# Patient Record
Sex: FEMALE | Race: WHITE | NOT HISPANIC OR LATINO
[De-identification: names, ages, dates, MRNs, and addresses within clinical notes are randomized per-mention and may not be internally consistent; named-entity substitution may affect disease eponyms.]

---

## 2017-02-26 ENCOUNTER — TRANSCRIPTION ENCOUNTER (OUTPATIENT)
Age: 57
End: 2017-02-26

## 2017-10-16 ENCOUNTER — APPOINTMENT (OUTPATIENT)
Dept: MAMMOGRAPHY | Facility: HOSPITAL | Age: 57
End: 2017-10-16
Payer: COMMERCIAL

## 2017-10-16 ENCOUNTER — OUTPATIENT (OUTPATIENT)
Dept: OUTPATIENT SERVICES | Facility: HOSPITAL | Age: 57
LOS: 1 days | End: 2017-10-16
Payer: COMMERCIAL

## 2017-10-16 PROCEDURE — G0202: CPT | Mod: 26

## 2017-10-16 PROCEDURE — 77067 SCR MAMMO BI INCL CAD: CPT

## 2017-10-16 PROCEDURE — 77063 BREAST TOMOSYNTHESIS BI: CPT | Mod: 26

## 2017-10-16 PROCEDURE — 77063 BREAST TOMOSYNTHESIS BI: CPT

## 2018-02-15 ENCOUNTER — TRANSCRIPTION ENCOUNTER (OUTPATIENT)
Age: 58
End: 2018-02-15

## 2018-10-15 ENCOUNTER — TRANSCRIPTION ENCOUNTER (OUTPATIENT)
Age: 58
End: 2018-10-15

## 2018-11-12 ENCOUNTER — OUTPATIENT (OUTPATIENT)
Dept: OUTPATIENT SERVICES | Facility: HOSPITAL | Age: 58
LOS: 1 days | End: 2018-11-12
Payer: COMMERCIAL

## 2018-11-12 ENCOUNTER — APPOINTMENT (OUTPATIENT)
Dept: MAMMOGRAPHY | Facility: HOSPITAL | Age: 58
End: 2018-11-12
Payer: COMMERCIAL

## 2018-11-12 DIAGNOSIS — Z00.8 ENCOUNTER FOR OTHER GENERAL EXAMINATION: ICD-10-CM

## 2018-11-12 PROCEDURE — 77067 SCR MAMMO BI INCL CAD: CPT

## 2018-11-12 PROCEDURE — 77063 BREAST TOMOSYNTHESIS BI: CPT

## 2018-11-12 PROCEDURE — 77063 BREAST TOMOSYNTHESIS BI: CPT | Mod: 26

## 2018-11-12 PROCEDURE — 77067 SCR MAMMO BI INCL CAD: CPT | Mod: 26

## 2019-09-20 ENCOUNTER — TRANSCRIPTION ENCOUNTER (OUTPATIENT)
Age: 59
End: 2019-09-20

## 2019-12-09 ENCOUNTER — APPOINTMENT (OUTPATIENT)
Dept: MAMMOGRAPHY | Facility: HOSPITAL | Age: 59
End: 2019-12-09
Payer: COMMERCIAL

## 2019-12-09 ENCOUNTER — OUTPATIENT (OUTPATIENT)
Dept: OUTPATIENT SERVICES | Facility: HOSPITAL | Age: 59
LOS: 1 days | End: 2019-12-09
Payer: COMMERCIAL

## 2019-12-09 DIAGNOSIS — Z00.8 ENCOUNTER FOR OTHER GENERAL EXAMINATION: ICD-10-CM

## 2019-12-09 PROCEDURE — 77063 BREAST TOMOSYNTHESIS BI: CPT

## 2019-12-09 PROCEDURE — 77063 BREAST TOMOSYNTHESIS BI: CPT | Mod: 26

## 2019-12-09 PROCEDURE — 77067 SCR MAMMO BI INCL CAD: CPT

## 2019-12-09 PROCEDURE — 77067 SCR MAMMO BI INCL CAD: CPT | Mod: 26

## 2019-12-18 ENCOUNTER — TRANSCRIPTION ENCOUNTER (OUTPATIENT)
Age: 59
End: 2019-12-18

## 2020-03-26 ENCOUNTER — TRANSCRIPTION ENCOUNTER (OUTPATIENT)
Age: 60
End: 2020-03-26

## 2020-04-02 ENCOUNTER — APPOINTMENT (OUTPATIENT)
Dept: ORTHOPEDIC SURGERY | Facility: CLINIC | Age: 60
End: 2020-04-02
Payer: COMMERCIAL

## 2020-04-02 ENCOUNTER — APPOINTMENT (OUTPATIENT)
Dept: MRI IMAGING | Facility: HOSPITAL | Age: 60
End: 2020-04-02

## 2020-04-02 ENCOUNTER — OUTPATIENT (OUTPATIENT)
Dept: OUTPATIENT SERVICES | Facility: HOSPITAL | Age: 60
LOS: 1 days | End: 2020-04-02
Payer: COMMERCIAL

## 2020-04-02 VITALS
TEMPERATURE: 98.7 F | HEIGHT: 62.5 IN | BODY MASS INDEX: 32.65 KG/M2 | HEART RATE: 95 BPM | WEIGHT: 182 LBS | SYSTOLIC BLOOD PRESSURE: 160 MMHG | DIASTOLIC BLOOD PRESSURE: 90 MMHG

## 2020-04-02 DIAGNOSIS — Z00.8 ENCOUNTER FOR OTHER GENERAL EXAMINATION: ICD-10-CM

## 2020-04-02 PROCEDURE — 72148 MRI LUMBAR SPINE W/O DYE: CPT

## 2020-04-02 PROCEDURE — 72110 X-RAY EXAM L-2 SPINE 4/>VWS: CPT

## 2020-04-02 PROCEDURE — 99203 OFFICE O/P NEW LOW 30 MIN: CPT

## 2020-04-02 PROCEDURE — 72170 X-RAY EXAM OF PELVIS: CPT | Mod: 59

## 2020-04-02 PROCEDURE — 72148 MRI LUMBAR SPINE W/O DYE: CPT | Mod: 26

## 2020-04-03 ENCOUNTER — TRANSCRIPTION ENCOUNTER (OUTPATIENT)
Age: 60
End: 2020-04-03

## 2020-04-16 ENCOUNTER — APPOINTMENT (OUTPATIENT)
Dept: ORTHOPEDIC SURGERY | Facility: CLINIC | Age: 60
End: 2020-04-16
Payer: COMMERCIAL

## 2020-04-16 VITALS — BODY MASS INDEX: 32.65 KG/M2 | WEIGHT: 182 LBS | HEIGHT: 62.5 IN | TEMPERATURE: 98.1 F

## 2020-04-16 DIAGNOSIS — M54.16 RADICULOPATHY, LUMBAR REGION: ICD-10-CM

## 2020-04-16 DIAGNOSIS — M51.26 OTHER INTERVERTEBRAL DISC DISPLACEMENT, LUMBAR REGION: ICD-10-CM

## 2020-04-16 PROCEDURE — 99214 OFFICE O/P EST MOD 30 MIN: CPT

## 2020-04-25 ENCOUNTER — MESSAGE (OUTPATIENT)
Age: 60
End: 2020-04-25

## 2020-05-01 ENCOUNTER — APPOINTMENT (OUTPATIENT)
Dept: DISASTER EMERGENCY | Facility: CLINIC | Age: 60
End: 2020-05-01

## 2020-05-02 LAB
SARS-COV-2 IGG SERPL IA-ACNC: 0 INDEX
SARS-COV-2 IGG SERPL QL IA: NEGATIVE

## 2021-05-01 ENCOUNTER — TRANSCRIPTION ENCOUNTER (OUTPATIENT)
Age: 61
End: 2021-05-01

## 2021-09-19 ENCOUNTER — APPOINTMENT (OUTPATIENT)
Dept: MRI IMAGING | Facility: IMAGING CENTER | Age: 61
End: 2021-09-19
Payer: COMMERCIAL

## 2021-09-19 ENCOUNTER — OUTPATIENT (OUTPATIENT)
Dept: OUTPATIENT SERVICES | Facility: HOSPITAL | Age: 61
LOS: 1 days | End: 2021-09-19
Payer: COMMERCIAL

## 2021-09-19 ENCOUNTER — RESULT REVIEW (OUTPATIENT)
Age: 61
End: 2021-09-19

## 2021-09-19 DIAGNOSIS — Z00.8 ENCOUNTER FOR OTHER GENERAL EXAMINATION: ICD-10-CM

## 2021-09-19 PROCEDURE — 73718 MRI LOWER EXTREMITY W/O DYE: CPT | Mod: 26,RT

## 2021-09-19 PROCEDURE — 73718 MRI LOWER EXTREMITY W/O DYE: CPT

## 2022-01-31 ENCOUNTER — NON-APPOINTMENT (OUTPATIENT)
Age: 62
End: 2022-01-31

## 2022-01-31 ENCOUNTER — APPOINTMENT (OUTPATIENT)
Dept: INTERNAL MEDICINE | Facility: CLINIC | Age: 62
End: 2022-01-31
Payer: COMMERCIAL

## 2022-01-31 ENCOUNTER — EMERGENCY (EMERGENCY)
Facility: HOSPITAL | Age: 62
LOS: 1 days | Discharge: HOME | End: 2022-01-31
Attending: EMERGENCY MEDICINE
Payer: COMMERCIAL

## 2022-01-31 VITALS
TEMPERATURE: 97.6 F | SYSTOLIC BLOOD PRESSURE: 186 MMHG | WEIGHT: 194 LBS | HEIGHT: 63 IN | DIASTOLIC BLOOD PRESSURE: 106 MMHG | BODY MASS INDEX: 34.38 KG/M2 | OXYGEN SATURATION: 96 % | HEART RATE: 92 BPM

## 2022-01-31 VITALS
DIASTOLIC BLOOD PRESSURE: 120 MMHG | WEIGHT: 190.04 LBS | TEMPERATURE: 99 F | HEART RATE: 100 BPM | OXYGEN SATURATION: 97 % | HEIGHT: 62 IN | RESPIRATION RATE: 16 BRPM | SYSTOLIC BLOOD PRESSURE: 229 MMHG

## 2022-01-31 VITALS
TEMPERATURE: 98 F | HEART RATE: 84 BPM | SYSTOLIC BLOOD PRESSURE: 173 MMHG | DIASTOLIC BLOOD PRESSURE: 115 MMHG | RESPIRATION RATE: 17 BRPM | OXYGEN SATURATION: 95 %

## 2022-01-31 DIAGNOSIS — R94.31 ABNORMAL ELECTROCARDIOGRAM [ECG] [EKG]: ICD-10-CM

## 2022-01-31 LAB
ALBUMIN SERPL ELPH-MCNC: 4.6 G/DL — SIGNIFICANT CHANGE UP (ref 3.3–5)
ALP SERPL-CCNC: 101 U/L — SIGNIFICANT CHANGE UP (ref 40–120)
ALT FLD-CCNC: 30 U/L — SIGNIFICANT CHANGE UP (ref 10–45)
ANION GAP SERPL CALC-SCNC: 12 MMOL/L — SIGNIFICANT CHANGE UP (ref 5–17)
APTT BLD: 32.1 SEC — SIGNIFICANT CHANGE UP (ref 27.5–35.5)
AST SERPL-CCNC: 19 U/L — SIGNIFICANT CHANGE UP (ref 10–40)
BASOPHILS # BLD AUTO: 0.04 K/UL — SIGNIFICANT CHANGE UP (ref 0–0.2)
BASOPHILS NFR BLD AUTO: 0.5 % — SIGNIFICANT CHANGE UP (ref 0–2)
BILIRUB SERPL-MCNC: 0.4 MG/DL — SIGNIFICANT CHANGE UP (ref 0.2–1.2)
BUN SERPL-MCNC: 14 MG/DL — SIGNIFICANT CHANGE UP (ref 7–23)
CALCIUM SERPL-MCNC: 9.5 MG/DL — SIGNIFICANT CHANGE UP (ref 8.4–10.5)
CHLORIDE SERPL-SCNC: 100 MMOL/L — SIGNIFICANT CHANGE UP (ref 96–108)
CO2 SERPL-SCNC: 23 MMOL/L — SIGNIFICANT CHANGE UP (ref 22–31)
CREAT SERPL-MCNC: 0.52 MG/DL — SIGNIFICANT CHANGE UP (ref 0.5–1.3)
EOSINOPHIL # BLD AUTO: 0.07 K/UL — SIGNIFICANT CHANGE UP (ref 0–0.5)
EOSINOPHIL NFR BLD AUTO: 0.8 % — SIGNIFICANT CHANGE UP (ref 0–6)
GLUCOSE SERPL-MCNC: 109 MG/DL — HIGH (ref 70–99)
HCT VFR BLD CALC: 46.5 % — HIGH (ref 34.5–45)
HGB BLD-MCNC: 14.6 G/DL — SIGNIFICANT CHANGE UP (ref 11.5–15.5)
IMM GRANULOCYTES NFR BLD AUTO: 0.6 % — SIGNIFICANT CHANGE UP (ref 0–1.5)
INR BLD: 0.99 RATIO — SIGNIFICANT CHANGE UP (ref 0.88–1.16)
LYMPHOCYTES # BLD AUTO: 1.99 K/UL — SIGNIFICANT CHANGE UP (ref 1–3.3)
LYMPHOCYTES # BLD AUTO: 24 % — SIGNIFICANT CHANGE UP (ref 13–44)
MAGNESIUM SERPL-MCNC: 2.1 MG/DL — SIGNIFICANT CHANGE UP (ref 1.6–2.6)
MCHC RBC-ENTMCNC: 30 PG — SIGNIFICANT CHANGE UP (ref 27–34)
MCHC RBC-ENTMCNC: 31.4 GM/DL — LOW (ref 32–36)
MCV RBC AUTO: 95.5 FL — SIGNIFICANT CHANGE UP (ref 80–100)
MONOCYTES # BLD AUTO: 0.72 K/UL — SIGNIFICANT CHANGE UP (ref 0–0.9)
MONOCYTES NFR BLD AUTO: 8.7 % — SIGNIFICANT CHANGE UP (ref 2–14)
NEUTROPHILS # BLD AUTO: 5.41 K/UL — SIGNIFICANT CHANGE UP (ref 1.8–7.4)
NEUTROPHILS NFR BLD AUTO: 65.4 % — SIGNIFICANT CHANGE UP (ref 43–77)
NRBC # BLD: 0 /100 WBCS — SIGNIFICANT CHANGE UP (ref 0–0)
NT-PROBNP SERPL-SCNC: 64 PG/ML — SIGNIFICANT CHANGE UP (ref 0–300)
PHOSPHATE SERPL-MCNC: 2.9 MG/DL — SIGNIFICANT CHANGE UP (ref 2.5–4.5)
PLATELET # BLD AUTO: 229 K/UL — SIGNIFICANT CHANGE UP (ref 150–400)
POTASSIUM SERPL-MCNC: 3.9 MMOL/L — SIGNIFICANT CHANGE UP (ref 3.5–5.3)
POTASSIUM SERPL-SCNC: 3.9 MMOL/L — SIGNIFICANT CHANGE UP (ref 3.5–5.3)
PROT SERPL-MCNC: 8 G/DL — SIGNIFICANT CHANGE UP (ref 6–8.3)
PROTHROM AB SERPL-ACNC: 11.9 SEC — SIGNIFICANT CHANGE UP (ref 10.6–13.6)
RBC # BLD: 4.87 M/UL — SIGNIFICANT CHANGE UP (ref 3.8–5.2)
RBC # FLD: 13.2 % — SIGNIFICANT CHANGE UP (ref 10.3–14.5)
SARS-COV-2 RNA SPEC QL NAA+PROBE: SIGNIFICANT CHANGE UP
SODIUM SERPL-SCNC: 135 MMOL/L — SIGNIFICANT CHANGE UP (ref 135–145)
TROPONIN T, HIGH SENSITIVITY RESULT: 7 NG/L — SIGNIFICANT CHANGE UP (ref 0–51)
TROPONIN T, HIGH SENSITIVITY RESULT: 8 NG/L — SIGNIFICANT CHANGE UP (ref 0–51)
WBC # BLD: 8.28 K/UL — SIGNIFICANT CHANGE UP (ref 3.8–10.5)
WBC # FLD AUTO: 8.28 K/UL — SIGNIFICANT CHANGE UP (ref 3.8–10.5)

## 2022-01-31 PROCEDURE — 93005 ELECTROCARDIOGRAM TRACING: CPT

## 2022-01-31 PROCEDURE — 90471 IMMUNIZATION ADMIN: CPT

## 2022-01-31 PROCEDURE — 93010 ELECTROCARDIOGRAM REPORT: CPT

## 2022-01-31 PROCEDURE — 99285 EMERGENCY DEPT VISIT HI MDM: CPT | Mod: 25

## 2022-01-31 PROCEDURE — 83880 ASSAY OF NATRIURETIC PEPTIDE: CPT

## 2022-01-31 PROCEDURE — 80053 COMPREHEN METABOLIC PANEL: CPT

## 2022-01-31 PROCEDURE — 85610 PROTHROMBIN TIME: CPT

## 2022-01-31 PROCEDURE — 71045 X-RAY EXAM CHEST 1 VIEW: CPT | Mod: 26

## 2022-01-31 PROCEDURE — U0005: CPT

## 2022-01-31 PROCEDURE — 85730 THROMBOPLASTIN TIME PARTIAL: CPT

## 2022-01-31 PROCEDURE — 83735 ASSAY OF MAGNESIUM: CPT

## 2022-01-31 PROCEDURE — 71045 X-RAY EXAM CHEST 1 VIEW: CPT

## 2022-01-31 PROCEDURE — U0003: CPT

## 2022-01-31 PROCEDURE — 99386 PREV VISIT NEW AGE 40-64: CPT | Mod: 25

## 2022-01-31 PROCEDURE — 84100 ASSAY OF PHOSPHORUS: CPT

## 2022-01-31 PROCEDURE — 84484 ASSAY OF TROPONIN QUANT: CPT

## 2022-01-31 PROCEDURE — 90750 HZV VACC RECOMBINANT IM: CPT

## 2022-01-31 PROCEDURE — 85025 COMPLETE CBC W/AUTO DIFF WBC: CPT

## 2022-01-31 RX ORDER — ACETAMINOPHEN 500 MG
975 TABLET ORAL ONCE
Refills: 0 | Status: COMPLETED | OUTPATIENT
Start: 2022-01-31 | End: 2022-01-31

## 2022-01-31 RX ADMIN — Medication 975 MILLIGRAM(S): at 12:58

## 2022-01-31 NOTE — ED PROVIDER NOTE - NSFOLLOWUPINSTRUCTIONS_ED_ALL_ED_FT
1. Please follow up with your Primary Care Doctor after discharge for continued management of your Blood pressure and schedule Echo as advised.  Bring a copy of your results to follow up appointment.    2. Please rest, stay hydrated and continue all at home medications including newly prescribed medication sent to your pharmacy today     3. Return to ED for any new or worsened symptoms including persistently elevated blood pressures, chest pain, headaches, dizziness, nausea/vomiting, weakness and any other concerns

## 2022-01-31 NOTE — ED PROVIDER NOTE - CLINICAL SUMMARY MEDICAL DECISION MAKING FREE TEXT BOX
Lisa VELAZQUEZ: high blood pressure - ekg w/o st elevations or depressions. Will check labs including trop. Per patient, pcp to schedule echo.     Work up negative. Plan to have patient take her medications as scheduled, follow up with pcp.

## 2022-01-31 NOTE — ED ADULT TRIAGE NOTE - CHIEF COMPLAINT QUOTE
sent from PCP office for high BP at yearly physical exam of 207/115, on ramipril once daily but did not take it yet

## 2022-01-31 NOTE — ED PROVIDER NOTE - ATTENDING CONTRIBUTION TO CARE
Patient is a 62 yo F with history HTN, hyperlipidemia sent in for evaluation of high blood pressure. Patient  reports she has a history of high blood pressure, has been on ramipril. She noted high blood readings at home around 180-190 and scheduled an appointment with a new pcp. Today at the office her BP was high around 207/115. She states she had a mild diffuse headache and started to have chest discomfort. No shortness of breath. No fevers, chills, nausea, vomiting. She has never had a cardiac work up. She reports swelling in her ankles after a day of work. She has mild shortness of breath with exertion, after having COVID in 2020. No vision changes or focal weakness. No nausea, vomiting, diarrhea.     VS noted  Gen. no acute distress, Non toxic   HEENT: EOMI, mmm, atraumatic  Lungs: CTAB/L no C/ W /R   CVS: RRR   Abd; Soft non tender, non distended   Ext: no edema  Skin: no rash  Neuro AAOx3 non focal clear speech  a/p: high blood pressure - ekg w/o st elevations or depressions. Will check labs including trop. Per patient, pcp to schedule echo. Will continue to follow.   - Lisa VELAZQUEZ

## 2022-01-31 NOTE — PHYSICAL EXAM
[Normal Sclera/Conjunctiva] : normal sclera/conjunctiva [PERRL] : pupils equal round and reactive to light [EOMI] : extraocular movements intact [Normal Outer Ear/Nose] : the outer ears and nose were normal in appearance [Normal Oropharynx] : the oropharynx was normal [No JVD] : no jugular venous distention [No Lymphadenopathy] : no lymphadenopathy [No Respiratory Distress] : no respiratory distress  [Normal Rate] : normal rate  [Soft] : abdomen soft [Non Tender] : non-tender [No CVA Tenderness] : no CVA  tenderness [No Spinal Tenderness] : no spinal tenderness [No Joint Swelling] : no joint swelling [Grossly Normal Strength/Tone] : grossly normal strength/tone [No Rash] : no rash [No Skin Lesions] : no skin lesions [Normal Affect] : the affect was normal [Alert and Oriented x3] : oriented to person, place, and time [Normal Insight/Judgement] : insight and judgment were intact [de-identified] : The pt is dizzy, and BP is 207/125 [de-identified] : EKG shows: Atrial Flutter. No chest pains, at this time. [de-identified] : The pt is dizzy. Positional dizziness noted. Headache reported.

## 2022-01-31 NOTE — HISTORY OF PRESENT ILLNESS
[FreeTextEntry1] : New Patient. [de-identified] : New Patient.\par \par CC = Hx of Dizziness and HTN and Hyperlipidemia.\par She has some dizziness, at this time.\par Elevated BP. BP re-check: 207/125.\par \par Family hx\par Mom = COPD\par Dad = Esophageal CA.\par \par Social hx\par ETOH = 2 / month\par Smoking = none\par Caffeine 1/ day\par Recreational drug usage = none.\par \par She is requesting for: RX refills today.\par \par Flu shot = up to date, according to pt.\par TDAP = up to date, according to pt.

## 2022-01-31 NOTE — ED PROVIDER NOTE - PATIENT PORTAL LINK FT
You can access the FollowMyHealth Patient Portal offered by [facility] by registering at the following website: [email or web address]. By joining [facility]’s FollowMyHealth portal, you will also be able to view your health information using other applications (apps) compatible with our system.

## 2022-01-31 NOTE — ED PROVIDER NOTE - OBJECTIVE STATEMENT
61 year old female with pmhx HLD, HTN on Ramipril presents to ED from PCP office for elevated BP. Pt states she has not saw a physician for a physical for approx 2 years but has been compliant with home BP meds. For past 1-2 weeks has been taking BP at home and has noticed readings systolic 180s-190s and diastolic 90s-100. Had yearly exam today and in office /115. Pt states that in office developed slight throbbing HA and slight chest discomfort and was sent to ED for eval. Reports PCP advised her plan would be to start an additional BP medication which she believes is a diuretic and to have and TTE as well. She states that she has had increased exertional dyspnea for over a year after having Covid. Denies prior cardiac work-up. Denies dizziness, vision change, sob, abd pain, n/v

## 2022-01-31 NOTE — REVIEW OF SYSTEMS
[Negative] : Heme/Lymph [FreeTextEntry2] : She is c/o: elevated BP and Dizziness. [de-identified] : She is c/o: elevated BP and Dizziness.

## 2022-01-31 NOTE — ED ADULT NURSE NOTE - OBJECTIVE STATEMENT
Pt was sent from PCP office for elevated BP, pt takes Ramipril. PMH HTN, HTL. PT is AOx4. Breathing unlabored symmetrical rise and fall of the chest. Abdomen is soft and nondistended. Strong upper peripheral pulses. Skin is warm and dry to touch. Pt denies blurry vison, fever, chills, N/V. Pt was sent from PCP office for elevated BP, pt takes Ramipril. Pt reports having a tension headache. PMH HTN, HTL. PT is AOx4. Breathing unlabored symmetrical rise and fall of the chest. Abdomen is soft and nondistended. Strong upper peripheral pulses. Skin is warm and dry to touch. Pt denies blurry vison, fever, chills, N/V.

## 2022-01-31 NOTE — ED PROVIDER NOTE - PROGRESS NOTE DETAILS
Pt trop 8 from 7. Feeling improved after meds in ED. Confirms she received text from pharmacy stating RX available for  and confirms will scheduled TTE and f/up for further BP management   Shanda Albarran PA-C Pt d/c /115, previously 172/102. Given presenting /120. Although BP decreased on its on. Further decreasing BP at this time may cause more harm than good. Pt is due to take Ramipril and will also start new BP medication. Discussed with Dr. Coreas who is agreeable with plan given pt is asymptomatic   Shanda Albarran PA-C

## 2022-01-31 NOTE — ED PROVIDER NOTE - PHYSICAL EXAMINATION
CONSTITUTIONAL: Patient is awake, alert and oriented x 3. Patient is well appearing and in no acute distress  HEAD: NCAT  EYES: PERRL b/l, EOMI  NECK: supple, FROM  LUNGS: CTA B/L, no wheezing or rales   HEART: RRR.+S1S2 no murmurs  ABDOMEN: Soft nd/nttp, no rebound or guarding  EXTREMITY: no edema or calf tenderness b/l, FROM upper and lower ext b/l  SKIN: with no rash or lesions  NEURO: No focal deficits

## 2022-01-31 NOTE — PLAN
[FreeTextEntry1] : \par \par BP is high at: 207/125.\par BP checked multiple times today.\par \par EKG is abnormal. (Atrial Flutter).\par \par Due to the pt's symptoms (headache/ dizziness), and high BP, she is going to the: [facility] ED immediately.\par \par Relative will drive her now (to [facility] ED).\par Pt deferred for EMS transport.\par \par Pt okay with the plan.\par \par All questions answered.\par \par Will follow-up with the pt, after the ED discharge.

## 2022-02-07 ENCOUNTER — APPOINTMENT (OUTPATIENT)
Dept: INTERNAL MEDICINE | Facility: CLINIC | Age: 62
End: 2022-02-07
Payer: COMMERCIAL

## 2022-02-07 VITALS
OXYGEN SATURATION: 97 % | HEART RATE: 94 BPM | BODY MASS INDEX: 34.02 KG/M2 | SYSTOLIC BLOOD PRESSURE: 173 MMHG | DIASTOLIC BLOOD PRESSURE: 104 MMHG | WEIGHT: 192 LBS | TEMPERATURE: 97.8 F | HEIGHT: 63 IN

## 2022-02-07 PROCEDURE — 99213 OFFICE O/P EST LOW 20 MIN: CPT

## 2022-02-07 NOTE — PLAN
[FreeTextEntry1] : No acute distress noted.\par \par Pt is in a pleasant mood, today.\par \par BP logs, indicated: overall lower BP readings.\par \par Reinforced teachings on: safe: diet/ exercise.\par \par DASH diet encouraged.\par \par I will see pt again soon, for: BP re-check.\par \par Pt will complete the labs/ blood-works, soon.\par \par ED precautions, if any Cardio/ Neuro/ or Pulm symptoms.\par \par All questions answered.\par \par Pt okay with the plan.\par \par Pt will call, if anything changes.

## 2022-02-07 NOTE — HISTORY OF PRESENT ILLNESS
[FreeTextEntry8] : Follow-up visit for HTN.\par \par Pt is feeling: "much better", since the last visit.\par \par According to pt, her overall BP readings are much lower.\par \par No acute symptoms, reported today.

## 2022-02-07 NOTE — PHYSICAL EXAM
[No Acute Distress] : no acute distress [Well Nourished] : well nourished [Well Developed] : well developed [Well-Appearing] : well-appearing [Normal Sclera/Conjunctiva] : normal sclera/conjunctiva [PERRL] : pupils equal round and reactive to light [EOMI] : extraocular movements intact [Normal Outer Ear/Nose] : the outer ears and nose were normal in appearance [Normal Oropharynx] : the oropharynx was normal [No Lymphadenopathy] : no lymphadenopathy [Supple] : supple [Thyroid Normal, No Nodules] : the thyroid was normal and there were no nodules present [No Respiratory Distress] : no respiratory distress  [No Accessory Muscle Use] : no accessory muscle use [Clear to Auscultation] : lungs were clear to auscultation bilaterally [Normal Rate] : normal rate  [Normal S1, S2] : normal S1 and S2 [Soft] : abdomen soft [Non Tender] : non-tender [Normal Bowel Sounds] : normal bowel sounds [No CVA Tenderness] : no CVA  tenderness [No Spinal Tenderness] : no spinal tenderness [No Joint Swelling] : no joint swelling [Grossly Normal Strength/Tone] : grossly normal strength/tone [No Rash] : no rash [Coordination Grossly Intact] : coordination grossly intact [No Focal Deficits] : no focal deficits [Normal Gait] : normal gait [Normal Affect] : the affect was normal [Alert and Oriented x3] : oriented to person, place, and time [Normal Insight/Judgement] : insight and judgment were intact

## 2022-02-10 ENCOUNTER — LABORATORY RESULT (OUTPATIENT)
Age: 62
End: 2022-02-10

## 2022-02-10 ENCOUNTER — RESULT REVIEW (OUTPATIENT)
Age: 62
End: 2022-02-10

## 2022-02-10 ENCOUNTER — APPOINTMENT (OUTPATIENT)
Dept: MAMMOGRAPHY | Facility: HOSPITAL | Age: 62
End: 2022-02-10
Payer: COMMERCIAL

## 2022-02-10 ENCOUNTER — OUTPATIENT (OUTPATIENT)
Dept: OUTPATIENT SERVICES | Facility: HOSPITAL | Age: 62
LOS: 1 days | End: 2022-02-10
Payer: COMMERCIAL

## 2022-02-10 DIAGNOSIS — Z00.8 ENCOUNTER FOR OTHER GENERAL EXAMINATION: ICD-10-CM

## 2022-02-10 PROCEDURE — 77063 BREAST TOMOSYNTHESIS BI: CPT | Mod: 26

## 2022-02-10 PROCEDURE — 77063 BREAST TOMOSYNTHESIS BI: CPT

## 2022-02-10 PROCEDURE — 77067 SCR MAMMO BI INCL CAD: CPT | Mod: 26

## 2022-02-10 PROCEDURE — 77067 SCR MAMMO BI INCL CAD: CPT

## 2022-02-11 DIAGNOSIS — N39.0 URINARY TRACT INFECTION, SITE NOT SPECIFIED: ICD-10-CM

## 2022-02-11 LAB
APPEARANCE: CLEAR
BASOPHILS # BLD AUTO: 0.04 K/UL
BASOPHILS NFR BLD AUTO: 0.5 %
BILIRUBIN URINE: NEGATIVE
BLOOD URINE: NEGATIVE
COLOR: YELLOW
EOSINOPHIL # BLD AUTO: 0.17 K/UL
EOSINOPHIL NFR BLD AUTO: 2.2 %
ESTIMATED AVERAGE GLUCOSE: 134 MG/DL
GLUCOSE QUALITATIVE U: NEGATIVE
HBA1C MFR BLD HPLC: 6.3 %
HCT VFR BLD CALC: 46.6 %
HCV AB SER QL: NONREACTIVE
HCV S/CO RATIO: 0.13 S/CO
HGB BLD-MCNC: 13.8 G/DL
HIV1+2 AB SPEC QL IA.RAPID: NONREACTIVE
IMM GRANULOCYTES NFR BLD AUTO: 0.3 %
KETONES URINE: NEGATIVE
LEUKOCYTE ESTERASE URINE: ABNORMAL
LYMPHOCYTES # BLD AUTO: 1.86 K/UL
LYMPHOCYTES NFR BLD AUTO: 23.9 %
MAN DIFF?: NORMAL
MCHC RBC-ENTMCNC: 28.6 PG
MCHC RBC-ENTMCNC: 29.6 GM/DL
MCV RBC AUTO: 96.7 FL
MONOCYTES # BLD AUTO: 0.68 K/UL
MONOCYTES NFR BLD AUTO: 8.7 %
NEUTROPHILS # BLD AUTO: 5.01 K/UL
NEUTROPHILS NFR BLD AUTO: 64.4 %
NITRITE URINE: NEGATIVE
PH URINE: 5.5
PLATELET # BLD AUTO: 220 K/UL
PROTEIN URINE: NORMAL
RBC # BLD: 4.82 M/UL
RBC # FLD: 13.2 %
SPECIFIC GRAVITY URINE: 1.02
UROBILINOGEN URINE: NORMAL
WBC # FLD AUTO: 7.78 K/UL

## 2022-02-14 ENCOUNTER — APPOINTMENT (OUTPATIENT)
Dept: INTERNAL MEDICINE | Facility: CLINIC | Age: 62
End: 2022-02-14
Payer: COMMERCIAL

## 2022-02-14 VITALS
SYSTOLIC BLOOD PRESSURE: 145 MMHG | DIASTOLIC BLOOD PRESSURE: 85 MMHG | OXYGEN SATURATION: 96 % | HEART RATE: 105 BPM | TEMPERATURE: 97.4 F

## 2022-02-14 LAB
25(OH)D3 SERPL-MCNC: 30.6 NG/ML
ALBUMIN SERPL ELPH-MCNC: 4.3 G/DL
ALP BLD-CCNC: 93 U/L
ALT SERPL-CCNC: 35 U/L
ANION GAP SERPL CALC-SCNC: 14 MMOL/L
AST SERPL-CCNC: 26 U/L
BACTERIA UR CULT: ABNORMAL
BILIRUB SERPL-MCNC: <0.2 MG/DL
BUN SERPL-MCNC: 11 MG/DL
CALCIUM SERPL-MCNC: 9.7 MG/DL
CHLORIDE SERPL-SCNC: 102 MMOL/L
CHOLEST SERPL-MCNC: 203 MG/DL
CO2 SERPL-SCNC: 26 MMOL/L
CREAT SERPL-MCNC: 0.65 MG/DL
GLUCOSE SERPL-MCNC: 116 MG/DL
HDLC SERPL-MCNC: 59 MG/DL
LDLC SERPL CALC-MCNC: 122 MG/DL
NONHDLC SERPL-MCNC: 144 MG/DL
POTASSIUM SERPL-SCNC: 4.8 MMOL/L
PROT SERPL-MCNC: 7.1 G/DL
SODIUM SERPL-SCNC: 142 MMOL/L
TRIGL SERPL-MCNC: 110 MG/DL
TSH SERPL-ACNC: 1.18 UIU/ML

## 2022-02-14 PROCEDURE — 99213 OFFICE O/P EST LOW 20 MIN: CPT

## 2022-02-14 NOTE — PHYSICAL EXAM
[No Acute Distress] : no acute distress [Well Nourished] : well nourished [Well Developed] : well developed [Well-Appearing] : well-appearing [Normal Sclera/Conjunctiva] : normal sclera/conjunctiva [PERRL] : pupils equal round and reactive to light [EOMI] : extraocular movements intact [Normal Outer Ear/Nose] : the outer ears and nose were normal in appearance [Normal Oropharynx] : the oropharynx was normal [No JVD] : no jugular venous distention [No Lymphadenopathy] : no lymphadenopathy [Supple] : supple [No Respiratory Distress] : no respiratory distress  [No Accessory Muscle Use] : no accessory muscle use [Clear to Auscultation] : lungs were clear to auscultation bilaterally [Normal Rate] : normal rate  [Normal S1, S2] : normal S1 and S2 [No Carotid Bruits] : no carotid bruits [No Edema] : there was no peripheral edema [Soft] : abdomen soft [Non Tender] : non-tender [Non-distended] : non-distended [Normal Posterior Cervical Nodes] : no posterior cervical lymphadenopathy [Normal Anterior Cervical Nodes] : no anterior cervical lymphadenopathy [No CVA Tenderness] : no CVA  tenderness [No Spinal Tenderness] : no spinal tenderness [No Joint Swelling] : no joint swelling [Grossly Normal Strength/Tone] : grossly normal strength/tone [No Rash] : no rash [No Skin Lesions] : no skin lesions [Coordination Grossly Intact] : coordination grossly intact [No Focal Deficits] : no focal deficits [Normal Gait] : normal gait [Deep Tendon Reflexes (DTR)] : deep tendon reflexes were 2+ and symmetric [Normal Affect] : the affect was normal [Alert and Oriented x3] : oriented to person, place, and time [Normal Insight/Judgement] : insight and judgment were intact

## 2022-02-14 NOTE — PLAN
[FreeTextEntry1] : \par \par Low salt diet.\par \par Safe exercise.\par \par All questions answered.\par \par Pt okay with the plan.

## 2022-02-14 NOTE — HISTORY OF PRESENT ILLNESS
[FreeTextEntry8] : HTN, follow-up visit.\par \par BP numbers are improving (overall).\par \par We discussed recent lab results today.

## 2022-02-22 ENCOUNTER — LABORATORY RESULT (OUTPATIENT)
Age: 62
End: 2022-02-22

## 2022-02-22 LAB
APPEARANCE: CLEAR
BASOPHILS # BLD AUTO: 0.05 K/UL
BASOPHILS NFR BLD AUTO: 0.6 %
BILIRUBIN URINE: NEGATIVE
BLOOD URINE: NEGATIVE
COLOR: YELLOW
EOSINOPHIL # BLD AUTO: 0.22 K/UL
EOSINOPHIL NFR BLD AUTO: 2.7 %
GLUCOSE QUALITATIVE U: NEGATIVE
HCT VFR BLD CALC: 46.4 %
HGB BLD-MCNC: 14.3 G/DL
IMM GRANULOCYTES NFR BLD AUTO: 0.5 %
KETONES URINE: NEGATIVE
LEUKOCYTE ESTERASE URINE: NEGATIVE
LYMPHOCYTES # BLD AUTO: 2.55 K/UL
LYMPHOCYTES NFR BLD AUTO: 31.1 %
MAN DIFF?: NORMAL
MCHC RBC-ENTMCNC: 29.9 PG
MCHC RBC-ENTMCNC: 30.8 GM/DL
MCV RBC AUTO: 96.9 FL
MONOCYTES # BLD AUTO: 0.86 K/UL
MONOCYTES NFR BLD AUTO: 10.5 %
NEUTROPHILS # BLD AUTO: 4.48 K/UL
NEUTROPHILS NFR BLD AUTO: 54.6 %
NITRITE URINE: NEGATIVE
PH URINE: 6.5
PLATELET # BLD AUTO: 231 K/UL
PROTEIN URINE: ABNORMAL
RBC # BLD: 4.79 M/UL
RBC # FLD: 13.2 %
SPECIFIC GRAVITY URINE: 1.03
UROBILINOGEN URINE: NORMAL
WBC # FLD AUTO: 8.2 K/UL

## 2022-02-24 LAB — BACTERIA UR CULT: NORMAL

## 2022-03-01 ENCOUNTER — APPOINTMENT (OUTPATIENT)
Dept: CV DIAGNOSITCS | Facility: HOSPITAL | Age: 62
End: 2022-03-01

## 2022-03-01 ENCOUNTER — OUTPATIENT (OUTPATIENT)
Dept: OUTPATIENT SERVICES | Facility: HOSPITAL | Age: 62
LOS: 1 days | End: 2022-03-01
Payer: COMMERCIAL

## 2022-03-01 DIAGNOSIS — R94.31 ABNORMAL ELECTROCARDIOGRAM [ECG] [EKG]: ICD-10-CM

## 2022-03-01 PROCEDURE — C8929: CPT

## 2022-03-01 PROCEDURE — 93306 TTE W/DOPPLER COMPLETE: CPT | Mod: 26

## 2022-03-08 ENCOUNTER — APPOINTMENT (OUTPATIENT)
Dept: INTERNAL MEDICINE | Facility: CLINIC | Age: 62
End: 2022-03-08
Payer: COMMERCIAL

## 2022-03-08 VITALS
HEART RATE: 101 BPM | DIASTOLIC BLOOD PRESSURE: 84 MMHG | WEIGHT: 188 LBS | SYSTOLIC BLOOD PRESSURE: 145 MMHG | HEIGHT: 63 IN | BODY MASS INDEX: 33.31 KG/M2 | OXYGEN SATURATION: 96 % | TEMPERATURE: 98 F

## 2022-03-08 PROCEDURE — 99213 OFFICE O/P EST LOW 20 MIN: CPT

## 2022-03-08 RX ORDER — RAMIPRIL 5 MG/1
5 CAPSULE ORAL DAILY
Qty: 90 | Refills: 1 | Status: DISCONTINUED | COMMUNITY
Start: 2022-01-31 | End: 2022-03-08

## 2022-03-08 NOTE — PHYSICAL EXAM
[No Acute Distress] : no acute distress [Well Nourished] : well nourished [Well Developed] : well developed [Well-Appearing] : well-appearing [Normal Sclera/Conjunctiva] : normal sclera/conjunctiva [PERRL] : pupils equal round and reactive to light [EOMI] : extraocular movements intact [Normal Outer Ear/Nose] : the outer ears and nose were normal in appearance [Normal Oropharynx] : the oropharynx was normal [No JVD] : no jugular venous distention [No Lymphadenopathy] : no lymphadenopathy [Supple] : supple [No Respiratory Distress] : no respiratory distress  [No Accessory Muscle Use] : no accessory muscle use [Clear to Auscultation] : lungs were clear to auscultation bilaterally [Normal Rate] : normal rate  [Normal S1, S2] : normal S1 and S2 [No Carotid Bruits] : no carotid bruits [No Abdominal Bruit] : a ~M bruit was not heard ~T in the abdomen [Soft] : abdomen soft [Non Tender] : non-tender [Normal Bowel Sounds] : normal bowel sounds [No CVA Tenderness] : no CVA  tenderness [No Spinal Tenderness] : no spinal tenderness [No Joint Swelling] : no joint swelling [Grossly Normal Strength/Tone] : grossly normal strength/tone [No Rash] : no rash [Coordination Grossly Intact] : coordination grossly intact [No Focal Deficits] : no focal deficits [Normal Gait] : normal gait [Deep Tendon Reflexes (DTR)] : deep tendon reflexes were 2+ and symmetric [Speech Grossly Normal] : speech grossly normal [Normal Affect] : the affect was normal [Alert and Oriented x3] : oriented to person, place, and time [Normal Insight/Judgement] : insight and judgment were intact

## 2022-03-08 NOTE — PLAN
[FreeTextEntry1] : \par \par D/C Ramipril 5 mg, Start: Lisinopril 10 mg.\par \par Low salt diet.\par \par Safe exercise.\par \par All questions answered.\par \par Pt okay with the plan. \par \par Will follow-up with pt soon, for another appt.

## 2022-03-08 NOTE — HISTORY OF PRESENT ILLNESS
[FreeTextEntry8] : \par \par HTN, follow-up visit.\par \par BP numbers, are ranging from: 123/74 to 168/110.\par \par BP Numbers are lowering in the evenings (overall).\par \par Pt is taking her: RX, as prescribed.\par \par No acute symptoms, reported today.

## 2022-03-11 ENCOUNTER — APPOINTMENT (OUTPATIENT)
Dept: INTERNAL MEDICINE | Facility: CLINIC | Age: 62
End: 2022-03-11
Payer: COMMERCIAL

## 2022-03-11 PROCEDURE — 99442: CPT

## 2022-03-11 NOTE — HISTORY OF PRESENT ILLNESS
[FreeTextEntry8] : \par \par Telephonic visit = 11 mins\par \par LOS = [id number].\par \par \par Pt verbalized that, BP numbers are improving (overall).\par \par Recent BP: 149/73.\par \par Pt taking Rx, as prescribed.\par \par

## 2022-03-11 NOTE — PLAN
[FreeTextEntry1] : \par \par Continue with: Low Salt Diet.\par \par Continue with: BP logs.\par \par Continue: Diet/ Exercise.\par \par All questions answered.\par \par Pt okay with the plan.

## 2022-03-22 ENCOUNTER — LABORATORY RESULT (OUTPATIENT)
Age: 62
End: 2022-03-22

## 2022-03-22 DIAGNOSIS — R79.89 OTHER SPECIFIED ABNORMAL FINDINGS OF BLOOD CHEMISTRY: ICD-10-CM

## 2022-03-22 LAB
ALBUMIN SERPL ELPH-MCNC: 4.4 G/DL
ALP BLD-CCNC: 96 U/L
ALT SERPL-CCNC: 40 U/L
ANION GAP SERPL CALC-SCNC: 15 MMOL/L
APPEARANCE: CLEAR
AST SERPL-CCNC: 31 U/L
BASOPHILS # BLD AUTO: 0.04 K/UL
BASOPHILS NFR BLD AUTO: 0.5 %
BILIRUB SERPL-MCNC: 0.4 MG/DL
BILIRUBIN URINE: NEGATIVE
BLOOD URINE: NEGATIVE
BUN SERPL-MCNC: 16 MG/DL
CALCIUM SERPL-MCNC: 9.3 MG/DL
CHLORIDE SERPL-SCNC: 103 MMOL/L
CHOLEST SERPL-MCNC: 228 MG/DL
CO2 SERPL-SCNC: 26 MMOL/L
COLOR: YELLOW
CREAT SERPL-MCNC: 0.6 MG/DL
EGFR: 101 ML/MIN/1.73M2
EOSINOPHIL # BLD AUTO: 0.21 K/UL
EOSINOPHIL NFR BLD AUTO: 2.6 %
GLUCOSE QUALITATIVE U: NEGATIVE
GLUCOSE SERPL-MCNC: 124 MG/DL
HCT VFR BLD CALC: 45.5 %
HDLC SERPL-MCNC: 59 MG/DL
HGB BLD-MCNC: 14.1 G/DL
IMM GRANULOCYTES NFR BLD AUTO: 0.4 %
KETONES URINE: NEGATIVE
LDLC SERPL CALC-MCNC: 145 MG/DL
LEUKOCYTE ESTERASE URINE: ABNORMAL
LYMPHOCYTES # BLD AUTO: 2.15 K/UL
LYMPHOCYTES NFR BLD AUTO: 26.2 %
MAN DIFF?: NORMAL
MCHC RBC-ENTMCNC: 30.3 PG
MCHC RBC-ENTMCNC: 31 GM/DL
MCV RBC AUTO: 97.8 FL
MONOCYTES # BLD AUTO: 0.85 K/UL
MONOCYTES NFR BLD AUTO: 10.3 %
NEUTROPHILS # BLD AUTO: 4.94 K/UL
NEUTROPHILS NFR BLD AUTO: 60 %
NITRITE URINE: NEGATIVE
NONHDLC SERPL-MCNC: 169 MG/DL
PH URINE: 6
PLATELET # BLD AUTO: 213 K/UL
POTASSIUM SERPL-SCNC: 4.4 MMOL/L
PROT SERPL-MCNC: 7.6 G/DL
PROTEIN URINE: ABNORMAL
RBC # BLD: 4.65 M/UL
RBC # FLD: 14 %
SODIUM SERPL-SCNC: 144 MMOL/L
SPECIFIC GRAVITY URINE: 1.03
TRIGL SERPL-MCNC: 118 MG/DL
UROBILINOGEN URINE: NORMAL
WBC # FLD AUTO: 8.22 K/UL

## 2022-03-24 LAB — BACTERIA UR CULT: NORMAL

## 2022-03-29 ENCOUNTER — APPOINTMENT (OUTPATIENT)
Dept: INTERNAL MEDICINE | Facility: CLINIC | Age: 62
End: 2022-03-29
Payer: COMMERCIAL

## 2022-03-29 VITALS
HEART RATE: 101 BPM | SYSTOLIC BLOOD PRESSURE: 134 MMHG | OXYGEN SATURATION: 96 % | DIASTOLIC BLOOD PRESSURE: 77 MMHG | HEIGHT: 63 IN | TEMPERATURE: 98.2 F | BODY MASS INDEX: 32.96 KG/M2 | WEIGHT: 186 LBS

## 2022-03-29 PROCEDURE — 99213 OFFICE O/P EST LOW 20 MIN: CPT

## 2022-03-29 NOTE — PLAN
[FreeTextEntry1] : \par \par Continue with: diet/ exercise.\par \par \par All questions answered.\par \par \par Pt okay with the plan.\par \par

## 2022-03-29 NOTE — HISTORY OF PRESENT ILLNESS
[FreeTextEntry8] : \par \par \par BP check, visit.\par \par \par No symptoms, today.\par \par \par (BP = 134/77).\par \par

## 2022-06-02 ENCOUNTER — APPOINTMENT (OUTPATIENT)
Dept: INTERNAL MEDICINE | Facility: CLINIC | Age: 62
End: 2022-06-02
Payer: COMMERCIAL

## 2022-06-02 VITALS
WEIGHT: 186 LBS | HEIGHT: 63 IN | TEMPERATURE: 96.7 F | SYSTOLIC BLOOD PRESSURE: 144 MMHG | OXYGEN SATURATION: 98 % | HEART RATE: 78 BPM | DIASTOLIC BLOOD PRESSURE: 77 MMHG | BODY MASS INDEX: 32.96 KG/M2

## 2022-06-02 PROCEDURE — 90471 IMMUNIZATION ADMIN: CPT

## 2022-06-02 PROCEDURE — 99214 OFFICE O/P EST MOD 30 MIN: CPT | Mod: 25

## 2022-06-02 PROCEDURE — 90750 HZV VACC RECOMBINANT IM: CPT

## 2022-06-02 NOTE — PLAN
[FreeTextEntry1] : \par \par Rx refilled, as requested.\par \par Counseled on: diet/ exercise, today.\par \par Peyton (KIM) gave: second Shingrix vaccine, today.\par \par All questions answered.\par \par Pt okay with the plan.\par \par

## 2022-06-02 NOTE — PHYSICAL EXAM
[No Acute Distress] : no acute distress [Well Nourished] : well nourished [Well Developed] : well developed [Well-Appearing] : well-appearing [Normal Sclera/Conjunctiva] : normal sclera/conjunctiva [PERRL] : pupils equal round and reactive to light [EOMI] : extraocular movements intact [Normal Outer Ear/Nose] : the outer ears and nose were normal in appearance [Normal Oropharynx] : the oropharynx was normal [No JVD] : no jugular venous distention [No Lymphadenopathy] : no lymphadenopathy [Supple] : supple [No Respiratory Distress] : no respiratory distress  [No Accessory Muscle Use] : no accessory muscle use [Clear to Auscultation] : lungs were clear to auscultation bilaterally [Normal Rate] : normal rate  [Normal S1, S2] : normal S1 and S2 [No Carotid Bruits] : no carotid bruits [No Edema] : there was no peripheral edema [Soft] : abdomen soft [Non Tender] : non-tender [Normal Bowel Sounds] : normal bowel sounds [Normal Posterior Cervical Nodes] : no posterior cervical lymphadenopathy [Normal Anterior Cervical Nodes] : no anterior cervical lymphadenopathy [No CVA Tenderness] : no CVA  tenderness [No Spinal Tenderness] : no spinal tenderness [No Joint Swelling] : no joint swelling [Grossly Normal Strength/Tone] : grossly normal strength/tone [No Rash] : no rash [No Skin Lesions] : no skin lesions [Coordination Grossly Intact] : coordination grossly intact [No Focal Deficits] : no focal deficits [Normal Gait] : normal gait [Speech Grossly Normal] : speech grossly normal [Normal Affect] : the affect was normal [Alert and Oriented x3] : oriented to person, place, and time [Normal Insight/Judgement] : insight and judgment were intact

## 2022-06-02 NOTE — HISTORY OF PRESENT ILLNESS
[FreeTextEntry1] : Rx refill. [de-identified] : \par \par Rx refill.\par \par \par Feels good today.\par \par Pt is exercising more.\par Walking at the beach, and stationary bike machine at home.\par \par \par She is taking all Rx, as prescribed.\par \par \par She needs: second Shingrix vaccine, today.\par \par \par \par

## 2022-06-20 ENCOUNTER — RX RENEWAL (OUTPATIENT)
Age: 62
End: 2022-06-20

## 2022-08-03 ENCOUNTER — NON-APPOINTMENT (OUTPATIENT)
Age: 62
End: 2022-08-03

## 2022-11-08 ENCOUNTER — RX RENEWAL (OUTPATIENT)
Age: 62
End: 2022-11-08

## 2022-11-14 ENCOUNTER — APPOINTMENT (OUTPATIENT)
Dept: INTERNAL MEDICINE | Facility: CLINIC | Age: 62
End: 2022-11-14

## 2022-11-14 VITALS
OXYGEN SATURATION: 98 % | DIASTOLIC BLOOD PRESSURE: 81 MMHG | SYSTOLIC BLOOD PRESSURE: 139 MMHG | HEART RATE: 86 BPM | BODY MASS INDEX: 32.6 KG/M2 | HEIGHT: 63 IN | WEIGHT: 184 LBS | TEMPERATURE: 97.3 F

## 2022-11-14 DIAGNOSIS — Z09 ENCOUNTER FOR FOLLOW-UP EXAMINATION AFTER COMPLETED TREATMENT FOR CONDITIONS OTHER THAN MALIGNANT NEOPLASM: ICD-10-CM

## 2022-11-14 PROCEDURE — 99213 OFFICE O/P EST LOW 20 MIN: CPT

## 2022-11-14 NOTE — PLAN
[FreeTextEntry1] : \par 1) Acid-Reflux\par Follow-up with GI.\par Avoid: Caffeine, ETOH, spicy foods, fatty foods, fried foods, or any citrus foods.\par Weight loss encouraged.\par \par \par 2) Rx refill\par Meds refilled, as requested.\par \par \par \par 3) Health Maintenance\par Labs ordered.\par DEXA scan ordered, as requested.\par \par \par \par Counseled on: diet/ exercise, today.\par \par All pt's questions answered.\par \par Pt okay with the plan.\par \par \par \par

## 2022-11-14 NOTE — REVIEW OF SYSTEMS
[Abdominal Pain] : no abdominal pain [Constipation] : no constipation [Vomiting] : no vomiting [Heartburn] : heartburn [Negative] : Heme/Lymph

## 2022-11-14 NOTE — PHYSICAL EXAM
[No Acute Distress] : no acute distress [Well Nourished] : well nourished [Well Developed] : well developed [Well-Appearing] : well-appearing [Normal Sclera/Conjunctiva] : normal sclera/conjunctiva [PERRL] : pupils equal round and reactive to light [EOMI] : extraocular movements intact [Normal Outer Ear/Nose] : the outer ears and nose were normal in appearance [Normal Oropharynx] : the oropharynx was normal [No JVD] : no jugular venous distention [No Lymphadenopathy] : no lymphadenopathy [Supple] : supple [Thyroid Normal, No Nodules] : the thyroid was normal and there were no nodules present [No Respiratory Distress] : no respiratory distress  [No Accessory Muscle Use] : no accessory muscle use [Clear to Auscultation] : lungs were clear to auscultation bilaterally [Normal Rate] : normal rate  [Normal S1, S2] : normal S1 and S2 [No Edema] : there was no peripheral edema [Soft] : abdomen soft [Non Tender] : non-tender [Normal Bowel Sounds] : normal bowel sounds [Normal Posterior Cervical Nodes] : no posterior cervical lymphadenopathy [Normal Anterior Cervical Nodes] : no anterior cervical lymphadenopathy [No CVA Tenderness] : no CVA  tenderness [No Spinal Tenderness] : no spinal tenderness [No Joint Swelling] : no joint swelling [Grossly Normal Strength/Tone] : grossly normal strength/tone [No Rash] : no rash [No Skin Lesions] : no skin lesions [Coordination Grossly Intact] : coordination grossly intact [No Focal Deficits] : no focal deficits [Normal Gait] : normal gait [Deep Tendon Reflexes (DTR)] : deep tendon reflexes were 2+ and symmetric [Speech Grossly Normal] : speech grossly normal [Normal Affect] : the affect was normal [Alert and Oriented x3] : oriented to person, place, and time [Normal Mood] : the mood was normal [Normal Insight/Judgement] : insight and judgment were intact

## 2022-11-14 NOTE — HISTORY OF PRESENT ILLNESS
[FreeTextEntry1] : Hx acid-reflux [de-identified] : \par \par 61 Y O Female, with PMH of: HTN, snoring, and other medical concerns, presents for: follow-up visit, today.\par She is c/o: Hx Acid-Reflux, for: many years.\par Alleviating factors: OTC Omeprazole.\par Aggravating factors: Large meals, or citrus foods.\par Symptoms are: on/off.\par No other symptoms, reported today.\par She is requesting for: Rx refills, today.\par \par

## 2022-11-16 LAB
ALBUMIN SERPL ELPH-MCNC: 4.4 G/DL
ALP BLD-CCNC: 83 U/L
ALT SERPL-CCNC: 28 U/L
ANION GAP SERPL CALC-SCNC: 13 MMOL/L
AST SERPL-CCNC: 21 U/L
BASOPHILS # BLD AUTO: 0.03 K/UL
BASOPHILS NFR BLD AUTO: 0.5 %
BILIRUB SERPL-MCNC: 0.5 MG/DL
BUN SERPL-MCNC: 17 MG/DL
CALCIUM SERPL-MCNC: 9.3 MG/DL
CHLORIDE SERPL-SCNC: 99 MMOL/L
CHOLEST SERPL-MCNC: 197 MG/DL
CO2 SERPL-SCNC: 27 MMOL/L
CREAT SERPL-MCNC: 0.65 MG/DL
EGFR: 99 ML/MIN/1.73M2
EOSINOPHIL # BLD AUTO: 0.14 K/UL
EOSINOPHIL NFR BLD AUTO: 2.2 %
ESTIMATED AVERAGE GLUCOSE: 134 MG/DL
GLUCOSE SERPL-MCNC: 120 MG/DL
HBA1C MFR BLD HPLC: 6.3 %
HCT VFR BLD CALC: 43.6 %
HDLC SERPL-MCNC: 56 MG/DL
HGB BLD-MCNC: 13.4 G/DL
IMM GRANULOCYTES NFR BLD AUTO: 0.5 %
LDLC SERPL CALC-MCNC: 112 MG/DL
LYMPHOCYTES # BLD AUTO: 1.95 K/UL
LYMPHOCYTES NFR BLD AUTO: 31.1 %
MAN DIFF?: NORMAL
MCHC RBC-ENTMCNC: 29.5 PG
MCHC RBC-ENTMCNC: 30.7 GM/DL
MCV RBC AUTO: 96 FL
MONOCYTES # BLD AUTO: 0.71 K/UL
MONOCYTES NFR BLD AUTO: 11.3 %
NEUTROPHILS # BLD AUTO: 3.41 K/UL
NEUTROPHILS NFR BLD AUTO: 54.4 %
NONHDLC SERPL-MCNC: 141 MG/DL
PLATELET # BLD AUTO: 214 K/UL
POTASSIUM SERPL-SCNC: 4.4 MMOL/L
PROT SERPL-MCNC: 7.3 G/DL
RBC # BLD: 4.54 M/UL
RBC # FLD: 13.3 %
SODIUM SERPL-SCNC: 138 MMOL/L
TRIGL SERPL-MCNC: 145 MG/DL
WBC # FLD AUTO: 6.27 K/UL

## 2023-01-12 ENCOUNTER — NON-APPOINTMENT (OUTPATIENT)
Age: 63
End: 2023-01-12

## 2023-03-06 ENCOUNTER — APPOINTMENT (OUTPATIENT)
Dept: INTERNAL MEDICINE | Facility: CLINIC | Age: 63
End: 2023-03-06

## 2023-03-06 ENCOUNTER — APPOINTMENT (OUTPATIENT)
Dept: INTERNAL MEDICINE | Facility: CLINIC | Age: 63
End: 2023-03-06
Payer: COMMERCIAL

## 2023-03-06 VITALS
WEIGHT: 186 LBS | DIASTOLIC BLOOD PRESSURE: 79 MMHG | TEMPERATURE: 98 F | BODY MASS INDEX: 34.23 KG/M2 | SYSTOLIC BLOOD PRESSURE: 131 MMHG | OXYGEN SATURATION: 98 % | HEIGHT: 62 IN | HEART RATE: 101 BPM

## 2023-03-06 DIAGNOSIS — M54.12 RADICULOPATHY, CERVICAL REGION: ICD-10-CM

## 2023-03-06 PROCEDURE — 99396 PREV VISIT EST AGE 40-64: CPT

## 2023-03-06 RX ORDER — BACILLUS COAGULANS/INULIN 1B-250 MG
CAPSULE ORAL
Refills: 0 | Status: DISCONTINUED | COMMUNITY
End: 2023-03-06

## 2023-03-06 RX ORDER — RAMIPRIL 5 MG/1
CAPSULE ORAL
Refills: 0 | Status: DISCONTINUED | COMMUNITY
End: 2023-03-06

## 2023-03-06 RX ORDER — METHYLPREDNISOLONE 4 MG/1
4 TABLET ORAL
Qty: 1 | Refills: 0 | Status: DISCONTINUED | COMMUNITY
Start: 2020-04-02 | End: 2023-03-06

## 2023-03-06 RX ORDER — AMOXICILLIN AND CLAVULANATE POTASSIUM 875; 125 MG/1; MG/1
875-125 TABLET, COATED ORAL
Qty: 14 | Refills: 0 | Status: DISCONTINUED | COMMUNITY
Start: 2022-02-11 | End: 2023-03-06

## 2023-03-06 RX ORDER — ATORVASTATIN CALCIUM 80 MG/1
TABLET, FILM COATED ORAL
Refills: 0 | Status: DISCONTINUED | COMMUNITY
End: 2023-03-06

## 2023-03-06 NOTE — PHYSICAL EXAM
[No Acute Distress] : no acute distress [Well Developed] : well developed [Well-Appearing] : well-appearing [Normal Sclera/Conjunctiva] : normal sclera/conjunctiva [PERRL] : pupils equal round and reactive to light [EOMI] : extraocular movements intact [Normal Outer Ear/Nose] : the outer ears and nose were normal in appearance [Normal Oropharynx] : the oropharynx was normal [No Respiratory Distress] : no respiratory distress  [No Accessory Muscle Use] : no accessory muscle use [Clear to Auscultation] : lungs were clear to auscultation bilaterally [Normal Rate] : normal rate  [Regular Rhythm] : with a regular rhythm [Normal S1, S2] : normal S1 and S2 [No Carotid Bruits] : no carotid bruits [No Abdominal Bruit] : a ~M bruit was not heard ~T in the abdomen [No Varicosities] : no varicosities [Pedal Pulses Present] : the pedal pulses are present [No Edema] : there was no peripheral edema [No Palpable Aorta] : no palpable aorta [No Extremity Clubbing/Cyanosis] : no extremity clubbing/cyanosis [Soft] : abdomen soft [Non Tender] : non-tender [Non-distended] : non-distended [No Masses] : no abdominal mass palpated [No Spinal Tenderness] : no spinal tenderness [Grossly Normal Strength/Tone] : grossly normal strength/tone [No Rash] : no rash [Normal Affect] : the affect was normal [Normal Insight/Judgement] : insight and judgment were intact [de-identified] : ?murmur best heard in the RUSB [de-identified] : 5/5 . Sensation grossly intact but decreased discrimination between soft and sharp noted over the lateral dorsal left hand.

## 2023-03-06 NOTE — ASSESSMENT
[FreeTextEntry1] : HCM: \par - COVID vaccinated\par - Flu vaccinated\par - tdap reported as UTD\par - Pap smear due, agreeable to [facility] referral\par - Lung CT not indicated given rare smoking\par \par RTC 2 months for cough.

## 2023-03-06 NOTE — HEALTH RISK ASSESSMENT
[No] : In the past 12 months have you used drugs other than those required for medical reasons? No [No falls in past year] : Patient reported no falls in the past year [0] : 2) Feeling down, depressed, or hopeless: Not at all (0) [PHQ-2 Negative - No further assessment needed] : PHQ-2 Negative - No further assessment needed [With Family] : lives with family [Employed] : employed [] :  [Reports changes in hearing] : Reports changes in hearing [Reports changes in vision] : Reports changes in vision [Smoke Detector] : smoke detector [Seat Belt] :  uses seat belt [de-identified] : Physically active job [[id number]] : 0 [Sexually Active] : not sexually active [Guns at Home] : no guns at home [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

## 2023-03-06 NOTE — HISTORY OF PRESENT ILLNESS
[de-identified] : Birgit Mike is a 61yo F with PMhx of HTN, HLD, and preDM who presents for CPE. \par \par BP concerns: HTN--BP ranges from 140-150 SBP and 80s DBP per home monitor\par Asking about Mammogram and pap smear\par \par Cough:\par A few months of intermittent cough. Feels like a phlegm in her throat. Worse with talking and in AM. Gets SOB with steps but no other SOB, wheezing. Does have some postnasal drip. \par \par Left arm pain:\par 1 month of pain shooting from her neck down her arm into her thumb with particular head movements (neck flexion and turning). Right-handed. No injury she can recall\par \par

## 2023-03-10 ENCOUNTER — APPOINTMENT (OUTPATIENT)
Dept: MAMMOGRAPHY | Facility: HOSPITAL | Age: 63
End: 2023-03-10
Payer: COMMERCIAL

## 2023-03-10 ENCOUNTER — OUTPATIENT (OUTPATIENT)
Dept: OUTPATIENT SERVICES | Facility: HOSPITAL | Age: 63
LOS: 1 days | End: 2023-03-10
Payer: COMMERCIAL

## 2023-03-10 ENCOUNTER — RESULT REVIEW (OUTPATIENT)
Age: 63
End: 2023-03-10

## 2023-03-10 ENCOUNTER — APPOINTMENT (OUTPATIENT)
Dept: MRI IMAGING | Facility: HOSPITAL | Age: 63
End: 2023-03-10
Payer: COMMERCIAL

## 2023-03-10 ENCOUNTER — APPOINTMENT (OUTPATIENT)
Dept: RADIOLOGY | Facility: HOSPITAL | Age: 63
End: 2023-03-10
Payer: COMMERCIAL

## 2023-03-10 DIAGNOSIS — R92.8 OTHER ABNORMAL AND INCONCLUSIVE FINDINGS ON DIAGNOSTIC IMAGING OF BREAST: ICD-10-CM

## 2023-03-10 DIAGNOSIS — Z00.8 ENCOUNTER FOR OTHER GENERAL EXAMINATION: ICD-10-CM

## 2023-03-10 DIAGNOSIS — M54.13 RADICULOPATHY, CERVICOTHORACIC REGION: ICD-10-CM

## 2023-03-10 DIAGNOSIS — R05.3 CHRONIC COUGH: ICD-10-CM

## 2023-03-10 PROCEDURE — 77063 BREAST TOMOSYNTHESIS BI: CPT | Mod: 26

## 2023-03-10 PROCEDURE — 77067 SCR MAMMO BI INCL CAD: CPT | Mod: 26

## 2023-03-10 PROCEDURE — 77067 SCR MAMMO BI INCL CAD: CPT

## 2023-03-10 PROCEDURE — 71046 X-RAY EXAM CHEST 2 VIEWS: CPT

## 2023-03-10 PROCEDURE — 77063 BREAST TOMOSYNTHESIS BI: CPT

## 2023-03-10 PROCEDURE — 71046 X-RAY EXAM CHEST 2 VIEWS: CPT | Mod: 26

## 2023-03-10 PROCEDURE — 72141 MRI NECK SPINE W/O DYE: CPT | Mod: 26

## 2023-03-10 PROCEDURE — 72141 MRI NECK SPINE W/O DYE: CPT

## 2023-03-16 ENCOUNTER — OUTPATIENT (OUTPATIENT)
Dept: OUTPATIENT SERVICES | Facility: HOSPITAL | Age: 63
LOS: 1 days | End: 2023-03-16
Payer: COMMERCIAL

## 2023-03-16 ENCOUNTER — APPOINTMENT (OUTPATIENT)
Dept: ULTRASOUND IMAGING | Facility: HOSPITAL | Age: 63
End: 2023-03-16

## 2023-03-16 ENCOUNTER — RESULT REVIEW (OUTPATIENT)
Age: 63
End: 2023-03-16

## 2023-03-16 ENCOUNTER — APPOINTMENT (OUTPATIENT)
Dept: MAMMOGRAPHY | Facility: HOSPITAL | Age: 63
End: 2023-03-16
Payer: COMMERCIAL

## 2023-03-16 DIAGNOSIS — R92.8 OTHER ABNORMAL AND INCONCLUSIVE FINDINGS ON DIAGNOSTIC IMAGING OF BREAST: ICD-10-CM

## 2023-03-16 PROCEDURE — 76641 ULTRASOUND BREAST COMPLETE: CPT

## 2023-03-16 PROCEDURE — G0279: CPT | Mod: 26

## 2023-03-16 PROCEDURE — G0279: CPT

## 2023-03-16 PROCEDURE — 77065 DX MAMMO INCL CAD UNI: CPT

## 2023-03-16 PROCEDURE — 77065 DX MAMMO INCL CAD UNI: CPT | Mod: 26,LT

## 2023-03-16 PROCEDURE — 76641 ULTRASOUND BREAST COMPLETE: CPT | Mod: 26,LT

## 2023-03-17 ENCOUNTER — TRANSCRIPTION ENCOUNTER (OUTPATIENT)
Age: 63
End: 2023-03-17

## 2023-03-17 DIAGNOSIS — M48.02 SPINAL STENOSIS, CERVICAL REGION: ICD-10-CM

## 2023-03-20 ENCOUNTER — TRANSCRIPTION ENCOUNTER (OUTPATIENT)
Age: 63
End: 2023-03-20

## 2023-04-07 ENCOUNTER — APPOINTMENT (OUTPATIENT)
Dept: INTERNAL MEDICINE | Facility: CLINIC | Age: 63
End: 2023-04-07
Payer: COMMERCIAL

## 2023-04-07 VITALS
SYSTOLIC BLOOD PRESSURE: 166 MMHG | OXYGEN SATURATION: 96 % | BODY MASS INDEX: 35.12 KG/M2 | TEMPERATURE: 97.7 F | HEIGHT: 61 IN | WEIGHT: 186 LBS | HEART RATE: 98 BPM | DIASTOLIC BLOOD PRESSURE: 84 MMHG

## 2023-04-07 DIAGNOSIS — N39.3 STRESS INCONTINENCE (FEMALE) (MALE): ICD-10-CM

## 2023-04-07 PROCEDURE — G0101: CPT

## 2023-04-07 PROCEDURE — 99214 OFFICE O/P EST MOD 30 MIN: CPT | Mod: 25

## 2023-04-07 NOTE — HISTORY OF PRESENT ILLNESS
[FreeTextEntry1] : here for pap [de-identified] : 63 yo F, here for pap. Last one probably 10 year ago.  Recently , not sexually active, does not note any vaginal symptoms. Does reports some DOMENIC mostly with strong cough. Trying to exercises but seems to keep injuring herself. Does want to lose weight.

## 2023-04-07 NOTE — PAST MEDICAL HISTORY
[Postmenopausal] : postmenopausal [Menarche Age ____] : age at menarche was [unfilled] [Menopause Age____] : age at menopause was [unfilled] [Total Preg ___] : G[unfilled] [Live Births ___] : P[unfilled]  [Abortions ___] : Abortions:[unfilled] [de-identified] : 25 age at first child [FreeTextEntry1] : no APOs, breast fed total of 4 years

## 2023-04-07 NOTE — REVIEW OF SYSTEMS
[Incontinence] : incontinence [FreeTextEntry2] : hat hot flashes and weight gain initially with menopause, no longer

## 2023-04-07 NOTE — PHYSICAL EXAM
[No Acute Distress] : no acute distress [Thyroid Normal, No Nodules] : the thyroid was normal and there were no nodules present [No Respiratory Distress] : no respiratory distress  [Normal] : soft, non-tender, non-distended, no masses palpated, no HSM and normal bowel sounds [Urethral Meatus] : the meatus of the urethra showed no abnormalities [External Female Genitalia] : normal external genitalia [Vagina] : normal vaginal exam [Cervix] : normal cervix [Uterus] : uterus was normal size, without masses or tenderness [Uterine Adnexae] : normal adnexa [de-identified] : obese [FreeTextEntry1] : no CMT, pap taken, pt tolerated procedure well

## 2023-04-10 LAB — HPV HIGH+LOW RISK DNA PNL CVX: NOT DETECTED

## 2023-04-12 ENCOUNTER — TRANSCRIPTION ENCOUNTER (OUTPATIENT)
Age: 63
End: 2023-04-12

## 2023-04-12 LAB — CYTOLOGY CVX/VAG DOC THIN PREP: ABNORMAL

## 2023-04-13 ENCOUNTER — TRANSCRIPTION ENCOUNTER (OUTPATIENT)
Age: 63
End: 2023-04-13

## 2023-04-15 ENCOUNTER — RESULT REVIEW (OUTPATIENT)
Age: 63
End: 2023-04-15

## 2023-04-15 ENCOUNTER — OUTPATIENT (OUTPATIENT)
Dept: OUTPATIENT SERVICES | Facility: HOSPITAL | Age: 63
LOS: 1 days | End: 2023-04-15
Payer: COMMERCIAL

## 2023-04-15 ENCOUNTER — APPOINTMENT (OUTPATIENT)
Dept: RADIOLOGY | Facility: HOSPITAL | Age: 63
End: 2023-04-15
Payer: COMMERCIAL

## 2023-04-15 DIAGNOSIS — Z00.8 ENCOUNTER FOR OTHER GENERAL EXAMINATION: ICD-10-CM

## 2023-04-15 PROCEDURE — 77080 DXA BONE DENSITY AXIAL: CPT

## 2023-04-15 PROCEDURE — 77080 DXA BONE DENSITY AXIAL: CPT | Mod: 26

## 2023-05-26 RX ORDER — FLUTICASONE PROPIONATE 50 UG/1
50 SPRAY, METERED NASAL TWICE DAILY
Qty: 1 | Refills: 1 | Status: ACTIVE | COMMUNITY
Start: 2023-03-06 | End: 1900-01-01

## 2023-06-05 ENCOUNTER — APPOINTMENT (OUTPATIENT)
Dept: INTERNAL MEDICINE | Facility: CLINIC | Age: 63
End: 2023-06-05
Payer: COMMERCIAL

## 2023-06-05 VITALS
SYSTOLIC BLOOD PRESSURE: 139 MMHG | HEIGHT: 61 IN | HEART RATE: 105 BPM | DIASTOLIC BLOOD PRESSURE: 81 MMHG | WEIGHT: 187 LBS | TEMPERATURE: 97.7 F | BODY MASS INDEX: 35.3 KG/M2 | OXYGEN SATURATION: 95 %

## 2023-06-05 DIAGNOSIS — R05.3 CHRONIC COUGH: ICD-10-CM

## 2023-06-05 DIAGNOSIS — R09.82 POSTNASAL DRIP: ICD-10-CM

## 2023-06-05 PROCEDURE — 99214 OFFICE O/P EST MOD 30 MIN: CPT

## 2023-06-05 NOTE — COUNSELING
[Potential consequences of obesity discussed] : Potential consequences of obesity discussed [Benefits of weight loss discussed] : Benefits of weight loss discussed [Encouraged to maintain food diary] : Encouraged to maintain food diary [Encouraged to increase physical activity] : Encouraged to increase physical activity [Decrease Portions] : decrease portions [FreeTextEntry2] : Poor, needs reinforcement

## 2023-06-05 NOTE — HISTORY OF PRESENT ILLNESS
[de-identified] : Birgit Mike is a 63yo F with PMhx of HTN, HLD, and preDM who presents for follow-up of multiple med complaints\par \par HTN:\par BP still a little elvated at home. Generally does not get lower than 120s, mostly in 130s and above.\par \par Somewhat interested in weight loss meds\par \par Cough:\par Doing much better\par \par Left arm pain:\par Doing better, still having some mild issues when she isn't focused on her posture

## 2023-06-05 NOTE — PHYSICAL EXAM
[No Acute Distress] : no acute distress [No Respiratory Distress] : no respiratory distress  [Normal Rate] : normal rate  [Normal Affect] : the affect was normal [Normal Insight/Judgement] : insight and judgment were intact [de-identified] : Answering questions appropriately. Gets on exam table and ambulates without assistance

## 2023-06-10 ENCOUNTER — NON-APPOINTMENT (OUTPATIENT)
Age: 63
End: 2023-06-10

## 2023-08-14 ENCOUNTER — RX RENEWAL (OUTPATIENT)
Age: 63
End: 2023-08-14

## 2023-09-19 ENCOUNTER — APPOINTMENT (OUTPATIENT)
Dept: ULTRASOUND IMAGING | Facility: HOSPITAL | Age: 63
End: 2023-09-19
Payer: COMMERCIAL

## 2023-09-19 ENCOUNTER — RESULT REVIEW (OUTPATIENT)
Age: 63
End: 2023-09-19

## 2023-09-19 ENCOUNTER — OUTPATIENT (OUTPATIENT)
Dept: OUTPATIENT SERVICES | Facility: HOSPITAL | Age: 63
LOS: 1 days | End: 2023-09-19
Payer: COMMERCIAL

## 2023-09-19 DIAGNOSIS — R92.8 OTHER ABNORMAL AND INCONCLUSIVE FINDINGS ON DIAGNOSTIC IMAGING OF BREAST: ICD-10-CM

## 2023-09-19 PROCEDURE — 76642 ULTRASOUND BREAST LIMITED: CPT

## 2023-09-19 PROCEDURE — 76642 ULTRASOUND BREAST LIMITED: CPT | Mod: 26,LT

## 2023-12-11 ENCOUNTER — APPOINTMENT (OUTPATIENT)
Dept: INTERNAL MEDICINE | Facility: CLINIC | Age: 63
End: 2023-12-11
Payer: COMMERCIAL

## 2023-12-11 ENCOUNTER — OUTPATIENT (OUTPATIENT)
Dept: OUTPATIENT SERVICES | Facility: HOSPITAL | Age: 63
LOS: 1 days | End: 2023-12-11
Payer: COMMERCIAL

## 2023-12-11 VITALS
SYSTOLIC BLOOD PRESSURE: 138 MMHG | HEIGHT: 61 IN | WEIGHT: 188 LBS | HEART RATE: 80 BPM | OXYGEN SATURATION: 97 % | BODY MASS INDEX: 35.5 KG/M2 | DIASTOLIC BLOOD PRESSURE: 80 MMHG

## 2023-12-11 DIAGNOSIS — Z12.4 ENCOUNTER FOR SCREENING FOR MALIGNANT NEOPLASM OF CERVIX: ICD-10-CM

## 2023-12-11 DIAGNOSIS — I10 ESSENTIAL (PRIMARY) HYPERTENSION: ICD-10-CM

## 2023-12-11 DIAGNOSIS — Z23 ENCOUNTER FOR IMMUNIZATION: ICD-10-CM

## 2023-12-11 DIAGNOSIS — Z13.820 ENCOUNTER FOR SCREENING FOR OSTEOPOROSIS: ICD-10-CM

## 2023-12-11 DIAGNOSIS — Z87.891 PERSONAL HISTORY OF NICOTINE DEPENDENCE: ICD-10-CM

## 2023-12-11 DIAGNOSIS — Z00.00 ENCOUNTER FOR GENERAL ADULT MEDICAL EXAMINATION W/OUT ABNORMAL FINDINGS: ICD-10-CM

## 2023-12-11 DIAGNOSIS — Z87.898 PERSONAL HISTORY OF OTHER SPECIFIED CONDITIONS: ICD-10-CM

## 2023-12-11 PROCEDURE — G0439: CPT | Mod: 25

## 2023-12-11 PROCEDURE — G0009: CPT

## 2023-12-11 PROCEDURE — 90677 PCV20 VACCINE IM: CPT

## 2023-12-11 PROCEDURE — 99397 PER PM REEVAL EST PAT 65+ YR: CPT | Mod: 25

## 2023-12-11 RX ORDER — LISINOPRIL 20 MG/1
20 TABLET ORAL
Qty: 90 | Refills: 3 | Status: ACTIVE | COMMUNITY
Start: 2022-03-08 | End: 1900-01-01

## 2023-12-11 RX ORDER — HYDROCHLOROTHIAZIDE 25 MG/1
25 TABLET ORAL
Qty: 90 | Refills: 3 | Status: ACTIVE | COMMUNITY
Start: 2022-01-31 | End: 1900-01-01

## 2023-12-12 DIAGNOSIS — E78.2 MIXED HYPERLIPIDEMIA: ICD-10-CM

## 2023-12-12 LAB
ALBUMIN SERPL ELPH-MCNC: 4.4 G/DL
ALP BLD-CCNC: 84 U/L
ALT SERPL-CCNC: 30 U/L
ANION GAP SERPL CALC-SCNC: 13 MMOL/L
AST SERPL-CCNC: 26 U/L
BILIRUB SERPL-MCNC: 0.5 MG/DL
BUN SERPL-MCNC: 13 MG/DL
CALCIUM SERPL-MCNC: 9.3 MG/DL
CHLORIDE SERPL-SCNC: 100 MMOL/L
CHOLEST SERPL-MCNC: 223 MG/DL
CO2 SERPL-SCNC: 28 MMOL/L
CREAT SERPL-MCNC: 0.6 MG/DL
EGFR: 100 ML/MIN/1.73M2
ESTIMATED AVERAGE GLUCOSE: 128 MG/DL
GLUCOSE SERPL-MCNC: 110 MG/DL
HBA1C MFR BLD HPLC: 6.1 %
HCT VFR BLD CALC: 45.3 %
HDLC SERPL-MCNC: 63 MG/DL
HGB BLD-MCNC: 13.8 G/DL
LDLC SERPL CALC-MCNC: 142 MG/DL
MCHC RBC-ENTMCNC: 29.6 PG
MCHC RBC-ENTMCNC: 30.5 GM/DL
MCV RBC AUTO: 97.2 FL
NONHDLC SERPL-MCNC: 160 MG/DL
PLATELET # BLD AUTO: 229 K/UL
POTASSIUM SERPL-SCNC: 4.2 MMOL/L
PROT SERPL-MCNC: 7.4 G/DL
RBC # BLD: 4.66 M/UL
RBC # FLD: 13.5 %
SODIUM SERPL-SCNC: 140 MMOL/L
TRIGL SERPL-MCNC: 101 MG/DL
WBC # FLD AUTO: 7.23 K/UL

## 2023-12-12 RX ORDER — ATORVASTATIN CALCIUM 20 MG/1
20 TABLET, FILM COATED ORAL
Qty: 90 | Refills: 3 | Status: ACTIVE | COMMUNITY
Start: 2022-01-31 | End: 1900-01-01

## 2023-12-21 DIAGNOSIS — Z00.00 ENCOUNTER FOR GENERAL ADULT MEDICAL EXAMINATION WITHOUT ABNORMAL FINDINGS: ICD-10-CM

## 2023-12-21 DIAGNOSIS — Z23 ENCOUNTER FOR IMMUNIZATION: ICD-10-CM

## 2024-03-06 LAB
ALBUMIN SERPL ELPH-MCNC: 4.2 G/DL
ALP BLD-CCNC: 83 U/L
ALT SERPL-CCNC: 32 U/L
AST SERPL-CCNC: 24 U/L
BILIRUB DIRECT SERPL-MCNC: 0.1 MG/DL
BILIRUB INDIRECT SERPL-MCNC: 0.2 MG/DL
BILIRUB SERPL-MCNC: 0.4 MG/DL
CHOLEST SERPL-MCNC: 180 MG/DL
HDLC SERPL-MCNC: 59 MG/DL
LDLC SERPL CALC-MCNC: 103 MG/DL
NONHDLC SERPL-MCNC: 121 MG/DL
PROT SERPL-MCNC: 7.2 G/DL
TRIGL SERPL-MCNC: 100 MG/DL

## 2024-04-05 ENCOUNTER — RESULT REVIEW (OUTPATIENT)
Age: 64
End: 2024-04-05

## 2024-04-05 ENCOUNTER — OUTPATIENT (OUTPATIENT)
Dept: OUTPATIENT SERVICES | Facility: HOSPITAL | Age: 64
LOS: 1 days | End: 2024-04-05
Payer: COMMERCIAL

## 2024-04-05 ENCOUNTER — APPOINTMENT (OUTPATIENT)
Dept: MAMMOGRAPHY | Facility: HOSPITAL | Age: 64
End: 2024-04-05
Payer: COMMERCIAL

## 2024-04-05 ENCOUNTER — APPOINTMENT (OUTPATIENT)
Dept: ULTRASOUND IMAGING | Facility: HOSPITAL | Age: 64
End: 2024-04-05
Payer: COMMERCIAL

## 2024-04-05 DIAGNOSIS — Z00.8 ENCOUNTER FOR OTHER GENERAL EXAMINATION: ICD-10-CM

## 2024-04-05 PROCEDURE — 77063 BREAST TOMOSYNTHESIS BI: CPT

## 2024-04-05 PROCEDURE — 77067 SCR MAMMO BI INCL CAD: CPT

## 2024-04-05 PROCEDURE — 76641 ULTRASOUND BREAST COMPLETE: CPT | Mod: 26,50

## 2024-04-05 PROCEDURE — 77067 SCR MAMMO BI INCL CAD: CPT | Mod: 26

## 2024-04-05 PROCEDURE — 77063 BREAST TOMOSYNTHESIS BI: CPT | Mod: 26

## 2024-04-05 PROCEDURE — 76641 ULTRASOUND BREAST COMPLETE: CPT

## 2024-11-20 ENCOUNTER — NON-APPOINTMENT (OUTPATIENT)
Age: 64
End: 2024-11-20

## 2024-12-16 ENCOUNTER — NON-APPOINTMENT (OUTPATIENT)
Age: 64
End: 2024-12-16

## 2024-12-26 ENCOUNTER — APPOINTMENT (OUTPATIENT)
Dept: INTERNAL MEDICINE | Facility: CLINIC | Age: 64
End: 2024-12-26

## 2025-01-03 ENCOUNTER — OUTPATIENT (OUTPATIENT)
Dept: OUTPATIENT SERVICES | Facility: HOSPITAL | Age: 65
LOS: 1 days | End: 2025-01-03
Payer: COMMERCIAL

## 2025-01-03 ENCOUNTER — APPOINTMENT (OUTPATIENT)
Dept: INTERNAL MEDICINE | Facility: CLINIC | Age: 65
End: 2025-01-03

## 2025-01-03 VITALS
DIASTOLIC BLOOD PRESSURE: 84 MMHG | BODY MASS INDEX: 34.93 KG/M2 | SYSTOLIC BLOOD PRESSURE: 122 MMHG | HEIGHT: 61 IN | HEART RATE: 99 BPM | OXYGEN SATURATION: 97 % | WEIGHT: 185 LBS

## 2025-01-03 DIAGNOSIS — Z00.00 ENCOUNTER FOR GENERAL ADULT MEDICAL EXAMINATION W/OUT ABNORMAL FINDINGS: ICD-10-CM

## 2025-01-03 DIAGNOSIS — M19.90 UNSPECIFIED OSTEOARTHRITIS, UNSPECIFIED SITE: ICD-10-CM

## 2025-01-03 DIAGNOSIS — I10 ESSENTIAL (PRIMARY) HYPERTENSION: ICD-10-CM

## 2025-01-03 DIAGNOSIS — R42 DIZZINESS AND GIDDINESS: ICD-10-CM

## 2025-01-03 DIAGNOSIS — E78.2 MIXED HYPERLIPIDEMIA: ICD-10-CM

## 2025-01-03 PROCEDURE — G0444 DEPRESSION SCREEN ANNUAL: CPT | Mod: 59

## 2025-01-03 PROCEDURE — 99397 PER PM REEVAL EST PAT 65+ YR: CPT

## 2025-01-03 PROCEDURE — G0439: CPT

## 2025-01-04 ENCOUNTER — APPOINTMENT (OUTPATIENT)
Dept: RADIOLOGY | Facility: HOSPITAL | Age: 65
End: 2025-01-04
Payer: COMMERCIAL

## 2025-01-04 ENCOUNTER — OUTPATIENT (OUTPATIENT)
Dept: OUTPATIENT SERVICES | Facility: HOSPITAL | Age: 65
LOS: 1 days | End: 2025-01-04
Payer: COMMERCIAL

## 2025-01-04 DIAGNOSIS — M19.90 UNSPECIFIED OSTEOARTHRITIS, UNSPECIFIED SITE: ICD-10-CM

## 2025-01-04 PROCEDURE — 73120 X-RAY EXAM OF HAND: CPT | Mod: 26,50

## 2025-01-04 PROCEDURE — 73120 X-RAY EXAM OF HAND: CPT

## 2025-01-06 LAB
ALBUMIN SERPL ELPH-MCNC: 4.1 G/DL
ALP BLD-CCNC: 81 U/L
ALT SERPL-CCNC: 29 U/L
ANA SER QL IA: NEGATIVE
ANION GAP SERPL CALC-SCNC: 14 MMOL/L
AST SERPL-CCNC: 22 U/L
BASOPHILS # BLD AUTO: 0.05 K/UL
BASOPHILS NFR BLD AUTO: 0.7 %
BILIRUB SERPL-MCNC: 0.5 MG/DL
BUN SERPL-MCNC: 11 MG/DL
CALCIUM SERPL-MCNC: 9.2 MG/DL
CENTROMERE IGG SER-ACNC: <0.2 AL
CHLORIDE SERPL-SCNC: 100 MMOL/L
CHOLEST SERPL-MCNC: 197 MG/DL
CHROMATIN AB SERPL-ACNC: <0.2 AL
CO2 SERPL-SCNC: 27 MMOL/L
CREAT SERPL-MCNC: 0.56 MG/DL
DSDNA AB SER-ACNC: <1 IU/ML
EGFR: 101 ML/MIN/1.73M2
ENA JO1 AB SER IA-ACNC: <0.2 AL
ENA RNP AB SER IA-ACNC: <0.2 AL
ENA SCL70 IGG SER IA-ACNC: <0.2 AL
ENA SM AB SER IA-ACNC: <0.2 AL
ENA SS-A AB SER IA-ACNC: <0.2 AL
ENA SS-B AB SER IA-ACNC: <0.2 AL
EOSINOPHIL # BLD AUTO: 0.13 K/UL
EOSINOPHIL NFR BLD AUTO: 1.7 %
ESTIMATED AVERAGE GLUCOSE: 137 MG/DL
GLUCOSE SERPL-MCNC: 94 MG/DL
HBA1C MFR BLD HPLC: 6.4 %
HCT VFR BLD CALC: 41.8 %
HCV AB SER QL: NONREACTIVE
HCV S/CO RATIO: 0.08 S/CO
HDLC SERPL-MCNC: 60 MG/DL
HGB BLD-MCNC: 13.2 G/DL
HIV1+2 AB SPEC QL IA.RAPID: NONREACTIVE
IMM GRANULOCYTES NFR BLD AUTO: 0.4 %
LDLC SERPL CALC-MCNC: 119 MG/DL
LYMPHOCYTES # BLD AUTO: 1.82 K/UL
LYMPHOCYTES NFR BLD AUTO: 24.2 %
MAN DIFF?: NORMAL
MCHC RBC-ENTMCNC: 29.8 PG
MCHC RBC-ENTMCNC: 31.6 G/DL
MCV RBC AUTO: 94.4 FL
MONOCYTES # BLD AUTO: 0.69 K/UL
MONOCYTES NFR BLD AUTO: 9.2 %
NEUTROPHILS # BLD AUTO: 4.8 K/UL
NEUTROPHILS NFR BLD AUTO: 63.8 %
NONHDLC SERPL-MCNC: 137 MG/DL
PLATELET # BLD AUTO: 208 K/UL
POTASSIUM SERPL-SCNC: 4.5 MMOL/L
PROT SERPL-MCNC: 7 G/DL
RBC # BLD: 4.43 M/UL
RBC # FLD: 13.9 %
RIBOSOMAL P AB SER IA-ACNC: <0.2 AL
SODIUM SERPL-SCNC: 141 MMOL/L
TRIGL SERPL-MCNC: 102 MG/DL
TSH SERPL-ACNC: 1.17 UIU/ML
WBC # FLD AUTO: 7.52 K/UL

## 2025-01-09 ENCOUNTER — TRANSCRIPTION ENCOUNTER (OUTPATIENT)
Age: 65
End: 2025-01-09

## 2025-01-10 DIAGNOSIS — E78.2 MIXED HYPERLIPIDEMIA: ICD-10-CM

## 2025-01-10 DIAGNOSIS — Z13.31 ENCOUNTER FOR SCREENING FOR DEPRESSION: ICD-10-CM

## 2025-01-10 DIAGNOSIS — Z00.00 ENCOUNTER FOR GENERAL ADULT MEDICAL EXAMINATION WITHOUT ABNORMAL FINDINGS: ICD-10-CM

## 2025-01-10 DIAGNOSIS — R42 DIZZINESS AND GIDDINESS: ICD-10-CM

## 2025-01-10 DIAGNOSIS — M19.90 UNSPECIFIED OSTEOARTHRITIS, UNSPECIFIED SITE: ICD-10-CM

## 2025-01-11 ENCOUNTER — APPOINTMENT (OUTPATIENT)
Dept: CARDIOLOGY | Facility: CLINIC | Age: 65
End: 2025-01-11

## 2025-01-11 DIAGNOSIS — R01.1 CARDIAC MURMUR, UNSPECIFIED: ICD-10-CM

## 2025-01-11 DIAGNOSIS — I10 ESSENTIAL (PRIMARY) HYPERTENSION: ICD-10-CM

## 2025-01-11 PROCEDURE — 93306 TTE W/DOPPLER COMPLETE: CPT

## 2025-01-14 ENCOUNTER — APPOINTMENT (OUTPATIENT)
Dept: PSYCHIATRY | Facility: CLINIC | Age: 65
End: 2025-01-14

## 2025-01-17 PROBLEM — R01.1 MURMUR: Status: ACTIVE | Noted: 2025-01-17

## 2025-03-17 ENCOUNTER — APPOINTMENT (OUTPATIENT)
Dept: GASTROENTEROLOGY | Facility: CLINIC | Age: 65
End: 2025-03-17

## 2025-04-01 ENCOUNTER — APPOINTMENT (OUTPATIENT)
Dept: INTERNAL MEDICINE | Facility: CLINIC | Age: 65
End: 2025-04-01

## 2025-04-04 ENCOUNTER — APPOINTMENT (OUTPATIENT)
Dept: INTERNAL MEDICINE | Facility: CLINIC | Age: 65
End: 2025-04-04
Payer: COMMERCIAL

## 2025-04-04 ENCOUNTER — OUTPATIENT (OUTPATIENT)
Dept: OUTPATIENT SERVICES | Facility: HOSPITAL | Age: 65
LOS: 1 days | End: 2025-04-04
Payer: COMMERCIAL

## 2025-04-04 VITALS
SYSTOLIC BLOOD PRESSURE: 144 MMHG | HEART RATE: 88 BPM | DIASTOLIC BLOOD PRESSURE: 78 MMHG | WEIGHT: 190 LBS | OXYGEN SATURATION: 94 % | BODY MASS INDEX: 35.9 KG/M2

## 2025-04-04 DIAGNOSIS — I10 ESSENTIAL (PRIMARY) HYPERTENSION: ICD-10-CM

## 2025-04-04 DIAGNOSIS — R42 DIZZINESS AND GIDDINESS: ICD-10-CM

## 2025-04-04 DIAGNOSIS — F32.A DEPRESSION, UNSPECIFIED: ICD-10-CM

## 2025-04-04 DIAGNOSIS — Z13.820 ENCOUNTER FOR SCREENING FOR OSTEOPOROSIS: ICD-10-CM

## 2025-04-04 DIAGNOSIS — M19.90 UNSPECIFIED OSTEOARTHRITIS, UNSPECIFIED SITE: ICD-10-CM

## 2025-04-04 PROCEDURE — 99214 OFFICE O/P EST MOD 30 MIN: CPT

## 2025-04-04 PROCEDURE — G0463: CPT

## 2025-04-04 PROCEDURE — G2211 COMPLEX E/M VISIT ADD ON: CPT

## 2025-04-15 DIAGNOSIS — R42 DIZZINESS AND GIDDINESS: ICD-10-CM

## 2025-04-15 DIAGNOSIS — M19.90 UNSPECIFIED OSTEOARTHRITIS, UNSPECIFIED SITE: ICD-10-CM

## 2025-04-15 DIAGNOSIS — F32.A DEPRESSION, UNSPECIFIED: ICD-10-CM

## 2025-04-15 DIAGNOSIS — Z13.820 ENCOUNTER FOR SCREENING FOR OSTEOPOROSIS: ICD-10-CM

## 2025-05-23 ENCOUNTER — APPOINTMENT (OUTPATIENT)
Dept: MAMMOGRAPHY | Facility: HOSPITAL | Age: 65
End: 2025-05-23
Payer: COMMERCIAL

## 2025-05-23 ENCOUNTER — RESULT REVIEW (OUTPATIENT)
Age: 65
End: 2025-05-23

## 2025-05-23 ENCOUNTER — OUTPATIENT (OUTPATIENT)
Dept: OUTPATIENT SERVICES | Facility: HOSPITAL | Age: 65
LOS: 1 days | End: 2025-05-23
Payer: COMMERCIAL

## 2025-05-23 ENCOUNTER — APPOINTMENT (OUTPATIENT)
Dept: ULTRASOUND IMAGING | Facility: HOSPITAL | Age: 65
End: 2025-05-23
Payer: COMMERCIAL

## 2025-05-23 DIAGNOSIS — R92.8 OTHER ABNORMAL AND INCONCLUSIVE FINDINGS ON DIAGNOSTIC IMAGING OF BREAST: ICD-10-CM

## 2025-05-23 PROCEDURE — 77067 SCR MAMMO BI INCL CAD: CPT | Mod: 26

## 2025-05-23 PROCEDURE — 76641 ULTRASOUND BREAST COMPLETE: CPT | Mod: 26,50

## 2025-05-23 PROCEDURE — 76641 ULTRASOUND BREAST COMPLETE: CPT

## 2025-05-23 PROCEDURE — 77067 SCR MAMMO BI INCL CAD: CPT

## 2025-05-23 PROCEDURE — 77063 BREAST TOMOSYNTHESIS BI: CPT

## 2025-05-23 PROCEDURE — 77063 BREAST TOMOSYNTHESIS BI: CPT | Mod: 26

## 2025-07-15 ENCOUNTER — APPOINTMENT (OUTPATIENT)
Dept: INTERNAL MEDICINE | Facility: CLINIC | Age: 65
End: 2025-07-15

## 2025-07-15 ENCOUNTER — OUTPATIENT (OUTPATIENT)
Dept: OUTPATIENT SERVICES | Facility: HOSPITAL | Age: 65
LOS: 1 days | End: 2025-07-15
Payer: COMMERCIAL

## 2025-07-15 VITALS
BODY MASS INDEX: 35.5 KG/M2 | WEIGHT: 188 LBS | OXYGEN SATURATION: 96 % | HEART RATE: 87 BPM | HEIGHT: 61 IN | SYSTOLIC BLOOD PRESSURE: 140 MMHG | DIASTOLIC BLOOD PRESSURE: 62 MMHG

## 2025-07-15 PROCEDURE — G0463: CPT

## 2025-07-15 PROCEDURE — 99214 OFFICE O/P EST MOD 30 MIN: CPT

## 2025-07-15 PROCEDURE — G2211 COMPLEX E/M VISIT ADD ON: CPT | Mod: NC

## 2025-07-16 DIAGNOSIS — I10 ESSENTIAL (PRIMARY) HYPERTENSION: ICD-10-CM

## 2025-07-17 LAB
ALBUMIN SERPL ELPH-MCNC: 4.4 G/DL
ALP BLD-CCNC: 91 U/L
ALT SERPL-CCNC: 34 U/L
ANION GAP SERPL CALC-SCNC: 14 MMOL/L
APPEARANCE: CLEAR
AST SERPL-CCNC: 24 U/L
BACTERIA UR CULT: NORMAL
BACTERIA: NEGATIVE /HPF
BASOPHILS # BLD AUTO: 0.06 K/UL
BASOPHILS NFR BLD AUTO: 0.7 %
BILIRUB SERPL-MCNC: 0.3 MG/DL
BILIRUBIN URINE: NEGATIVE
BLOOD URINE: NEGATIVE
BUN SERPL-MCNC: 20 MG/DL
CALCIUM SERPL-MCNC: 9.8 MG/DL
CAST: 0 /LPF
CHLORIDE SERPL-SCNC: 100 MMOL/L
CO2 SERPL-SCNC: 25 MMOL/L
COLOR: YELLOW
CREAT SERPL-MCNC: 0.73 MG/DL
CRP SERPL-MCNC: 6 MG/L
EGFRCR SERPLBLD CKD-EPI 2021: 91 ML/MIN/1.73M2
EOSINOPHIL # BLD AUTO: 0.13 K/UL
EOSINOPHIL NFR BLD AUTO: 1.4 %
EPITHELIAL CELLS: 1 /HPF
FERRITIN SERPL-MCNC: 64 NG/ML
GLUCOSE QUALITATIVE U: NEGATIVE MG/DL
GLUCOSE SERPL-MCNC: 113 MG/DL
HCT VFR BLD CALC: 44.5 %
HGB BLD-MCNC: 14 G/DL
IMM GRANULOCYTES NFR BLD AUTO: 0.3 %
IRON SATN MFR SERPL: 24 %
IRON SERPL-MCNC: 90 UG/DL
KETONES URINE: NEGATIVE MG/DL
LEUKOCYTE ESTERASE URINE: ABNORMAL
LYMPHOCYTES # BLD AUTO: 2.04 K/UL
LYMPHOCYTES NFR BLD AUTO: 22.5 %
MAN DIFF?: NORMAL
MCHC RBC-ENTMCNC: 30 PG
MCHC RBC-ENTMCNC: 31.5 G/DL
MCV RBC AUTO: 95.3 FL
MICROSCOPIC-UA: NORMAL
MONOCYTES # BLD AUTO: 1.07 K/UL
MONOCYTES NFR BLD AUTO: 11.8 %
NEUTROPHILS # BLD AUTO: 5.73 K/UL
NEUTROPHILS NFR BLD AUTO: 63.3 %
NITRITE URINE: NEGATIVE
PH URINE: 7
PLATELET # BLD AUTO: 210 K/UL
POTASSIUM SERPL-SCNC: 4.2 MMOL/L
PROT SERPL-MCNC: 7.6 G/DL
PROTEIN URINE: NEGATIVE MG/DL
RBC # BLD: 4.67 M/UL
RBC # FLD: 14.2 %
RED BLOOD CELLS URINE: 1 /HPF
SODIUM SERPL-SCNC: 139 MMOL/L
SPECIFIC GRAVITY URINE: 1.02
TIBC SERPL-MCNC: 375 UG/DL
UIBC SERPL-MCNC: 286 UG/DL
UROBILINOGEN URINE: 0.2 MG/DL
WBC # FLD AUTO: 9.06 K/UL
WHITE BLOOD CELLS URINE: 2 /HPF

## 2025-07-21 DIAGNOSIS — N95.0 POSTMENOPAUSAL BLEEDING: ICD-10-CM

## 2025-07-23 ENCOUNTER — APPOINTMENT (OUTPATIENT)
Dept: OBGYN | Facility: CLINIC | Age: 65
End: 2025-07-23
Payer: COMMERCIAL

## 2025-07-23 ENCOUNTER — NON-APPOINTMENT (OUTPATIENT)
Age: 65
End: 2025-07-23

## 2025-07-23 VITALS
DIASTOLIC BLOOD PRESSURE: 85 MMHG | SYSTOLIC BLOOD PRESSURE: 149 MMHG | WEIGHT: 186 LBS | HEIGHT: 62 IN | BODY MASS INDEX: 34.23 KG/M2

## 2025-07-23 PROCEDURE — 58100 BIOPSY OF UTERUS LINING: CPT

## 2025-07-23 PROCEDURE — 99203 OFFICE O/P NEW LOW 30 MIN: CPT | Mod: 25

## 2025-07-24 LAB — HPV HIGH+LOW RISK DNA PNL CVX: NOT DETECTED

## 2025-07-28 LAB — CYTOLOGY CVX/VAG DOC THIN PREP: ABNORMAL

## 2025-07-29 ENCOUNTER — APPOINTMENT (OUTPATIENT)
Dept: ULTRASOUND IMAGING | Facility: CLINIC | Age: 65
End: 2025-07-29
Payer: COMMERCIAL

## 2025-07-29 ENCOUNTER — OUTPATIENT (OUTPATIENT)
Dept: OUTPATIENT SERVICES | Facility: HOSPITAL | Age: 65
LOS: 1 days | End: 2025-07-29
Payer: COMMERCIAL

## 2025-07-29 DIAGNOSIS — N95.0 POSTMENOPAUSAL BLEEDING: ICD-10-CM

## 2025-07-29 LAB — CORE LAB BIOPSY: NORMAL

## 2025-07-29 PROCEDURE — 76856 US EXAM PELVIC COMPLETE: CPT | Mod: 26

## 2025-07-29 PROCEDURE — 76856 US EXAM PELVIC COMPLETE: CPT

## 2025-07-29 PROCEDURE — 76830 TRANSVAGINAL US NON-OB: CPT

## 2025-07-29 PROCEDURE — 76830 TRANSVAGINAL US NON-OB: CPT | Mod: 26

## 2025-08-14 ENCOUNTER — APPOINTMENT (OUTPATIENT)
Dept: INTERNAL MEDICINE | Facility: CLINIC | Age: 65
End: 2025-08-14

## 2025-08-14 DIAGNOSIS — Z87.42 PERSONAL HISTORY OF OTHER DISEASES OF THE FEMALE GENITAL TRACT: ICD-10-CM

## 2025-08-14 DIAGNOSIS — N84.0 POLYP OF CORPUS UTERI: ICD-10-CM

## 2025-08-14 DIAGNOSIS — N95.0 POSTMENOPAUSAL BLEEDING: ICD-10-CM

## 2025-08-14 PROCEDURE — G2211 COMPLEX E/M VISIT ADD ON: CPT | Mod: NC,95

## 2025-08-14 PROCEDURE — 99213 OFFICE O/P EST LOW 20 MIN: CPT | Mod: 95

## 2025-09-12 ENCOUNTER — ASOB RESULT (OUTPATIENT)
Age: 65
End: 2025-09-12

## 2025-09-12 ENCOUNTER — APPOINTMENT (OUTPATIENT)
Dept: OBGYN | Facility: CLINIC | Age: 65
End: 2025-09-12
Payer: COMMERCIAL

## 2025-09-12 ENCOUNTER — APPOINTMENT (OUTPATIENT)
Dept: OBGYN | Facility: CLINIC | Age: 65
End: 2025-09-12

## 2025-09-12 VITALS
WEIGHT: 187 LBS | DIASTOLIC BLOOD PRESSURE: 77 MMHG | SYSTOLIC BLOOD PRESSURE: 160 MMHG | BODY MASS INDEX: 33.13 KG/M2 | HEIGHT: 63 IN

## 2025-09-12 DIAGNOSIS — N84.0 POLYP OF CORPUS UTERI: ICD-10-CM

## 2025-09-12 PROCEDURE — 58340 CATHETER FOR HYSTEROGRAPHY: CPT

## 2025-09-12 PROCEDURE — 76831 ECHO EXAM UTERUS: CPT

## 2025-09-25 PROBLEM — Z01.818 PREOP TESTING: Status: ACTIVE | Noted: 2025-09-25
